# Patient Record
Sex: FEMALE | HISPANIC OR LATINO | Employment: FULL TIME | URBAN - METROPOLITAN AREA
[De-identification: names, ages, dates, MRNs, and addresses within clinical notes are randomized per-mention and may not be internally consistent; named-entity substitution may affect disease eponyms.]

---

## 2021-04-19 ENCOUNTER — NON-PROVIDER VISIT (OUTPATIENT)
Dept: URGENT CARE | Facility: PHYSICIAN GROUP | Age: 21
End: 2021-04-19

## 2021-04-19 DIAGNOSIS — Z11.1 PPD SCREENING TEST: ICD-10-CM

## 2021-04-19 PROCEDURE — 99999 PR NO CHARGE: CPT | Performed by: EMERGENCY MEDICINE

## 2021-04-19 PROCEDURE — 86580 TB INTRADERMAL TEST: CPT | Performed by: PHYSICIAN ASSISTANT

## 2021-04-20 NOTE — NON-PROVIDER
Amarilis PARKER is a 20 y.o. female here for a non-provider visit for PPD placement -- Step 1 of 2    Reason for PPD:  school requirement    1. TB evaluation questionnaire completed by patient? Yes      -  If any answers marked yes did you contact a provider prior to placing? Not Indicated  2.  Patient notified to return to clinic for reading on: 04/21/21 after 4:11 or 04/22/21 before 4:11  3.  PPD Placement documentation completed on TB evaluation questionnaire? Yes  4.  Location of TB evaluation questionnaire filed:  file

## 2021-04-20 NOTE — ADDENDUM NOTE
Addended by: HANK CORTES on: 4/19/2021 05:06 PM     Modules accepted: Level of Service, SmartSet

## 2021-04-22 ENCOUNTER — NON-PROVIDER VISIT (OUTPATIENT)
Dept: URGENT CARE | Facility: PHYSICIAN GROUP | Age: 21
End: 2021-04-22

## 2021-04-22 LAB — TB WHEAL 3D P 5 TU DIAM: NORMAL MM

## 2021-04-22 PROCEDURE — 99999 PR NO CHARGE: CPT | Performed by: NURSE PRACTITIONER

## 2021-04-22 NOTE — NON-PROVIDER
Amarilis PARKER is a 20 y.o. female here for a non-provider visit for PPD reading -- Step 1 of 2.      1.  Resulted in Epic under enter/edit results? Yes   2.  TB evaluation questionnaire scanned into chart and original given to patient?Yes      3. Was induration greater than 0 mm? No.    If Step 1 of 2, when is patient returning for second step (delete if N/A): Monday 26th or Tuesday the 27th at the earliest     Routed to PCP? No

## 2021-04-27 ENCOUNTER — NON-PROVIDER VISIT (OUTPATIENT)
Dept: URGENT CARE | Facility: PHYSICIAN GROUP | Age: 21
End: 2021-04-27

## 2021-04-27 DIAGNOSIS — Z11.1 PPD SCREENING TEST: ICD-10-CM

## 2021-04-27 PROCEDURE — 86580 TB INTRADERMAL TEST: CPT | Performed by: FAMILY MEDICINE

## 2021-04-27 PROCEDURE — 99999 PR NO CHARGE: CPT | Performed by: FAMILY MEDICINE

## 2021-04-28 NOTE — NON-PROVIDER
Amarilis PARKER is a 20 y.o. female here for a non-provider visit for PPD placement -- Step 2 of 2    Reason for PPD:  school requirement    1. TB evaluation questionnaire completed by patient? Yes      -  If any answers marked yes did you contact a provider prior to placing? Not Indicated  2.  Patient notified to return to clinic for reading on: 04/29/21 after 4:00 or 04/30/21 before 4:00  3.  PPD Placement documentation completed on TB evaluation questionnaire? Yes  4.  Location of TB evaluation questionnaire filed:  file

## 2021-04-30 ENCOUNTER — OFFICE VISIT (OUTPATIENT)
Dept: URGENT CARE | Facility: PHYSICIAN GROUP | Age: 21
End: 2021-04-30

## 2021-04-30 LAB — TB WHEAL 3D P 5 TU DIAM: 0 MM

## 2021-04-30 NOTE — NON-PROVIDER
Amarilis ANANDSimaDEJAN is a 20 y.o. female here for a non-provider visit for PPD reading -- Step 2 of 2.      1.  Resulted in Epic under enter/edit results? Yes   2.  TB evaluation questionnaire scanned into chart and original given to patient?Yes      3. Was induration greater than 0 mm? No.      Routed to PCP? No

## 2021-05-19 ENCOUNTER — OFFICE VISIT (OUTPATIENT)
Dept: URGENT CARE | Facility: PHYSICIAN GROUP | Age: 21
End: 2021-05-19
Payer: OTHER GOVERNMENT

## 2021-05-19 ENCOUNTER — HOSPITAL ENCOUNTER (OUTPATIENT)
Facility: MEDICAL CENTER | Age: 21
End: 2021-05-19
Attending: NURSE PRACTITIONER
Payer: OTHER GOVERNMENT

## 2021-05-19 VITALS
RESPIRATION RATE: 20 BRPM | WEIGHT: 180 LBS | HEART RATE: 83 BPM | OXYGEN SATURATION: 99 % | DIASTOLIC BLOOD PRESSURE: 58 MMHG | TEMPERATURE: 98.2 F | BODY MASS INDEX: 30.73 KG/M2 | HEIGHT: 64 IN | SYSTOLIC BLOOD PRESSURE: 104 MMHG

## 2021-05-19 DIAGNOSIS — R09.81 NASAL CONGESTION WITH RHINORRHEA: ICD-10-CM

## 2021-05-19 DIAGNOSIS — R05.9 COUGH: ICD-10-CM

## 2021-05-19 DIAGNOSIS — J02.9 SORE THROAT: ICD-10-CM

## 2021-05-19 DIAGNOSIS — J34.89 NASAL CONGESTION WITH RHINORRHEA: ICD-10-CM

## 2021-05-19 LAB
COVID ORDER STATUS COVID19: NORMAL
INT CON NEG: NEGATIVE
INT CON POS: POSITIVE
S PYO AG THROAT QL: NEGATIVE

## 2021-05-19 PROCEDURE — U0005 INFEC AGEN DETEC AMPLI PROBE: HCPCS

## 2021-05-19 PROCEDURE — U0003 INFECTIOUS AGENT DETECTION BY NUCLEIC ACID (DNA OR RNA); SEVERE ACUTE RESPIRATORY SYNDROME CORONAVIRUS 2 (SARS-COV-2) (CORONAVIRUS DISEASE [COVID-19]), AMPLIFIED PROBE TECHNIQUE, MAKING USE OF HIGH THROUGHPUT TECHNOLOGIES AS DESCRIBED BY CMS-2020-01-R: HCPCS

## 2021-05-19 PROCEDURE — 99203 OFFICE O/P NEW LOW 30 MIN: CPT | Performed by: NURSE PRACTITIONER

## 2021-05-19 PROCEDURE — 87880 STREP A ASSAY W/OPTIC: CPT | Performed by: NURSE PRACTITIONER

## 2021-05-19 ASSESSMENT — ENCOUNTER SYMPTOMS
EYE DISCHARGE: 0
CHILLS: 0
MYALGIAS: 0
NECK PAIN: 0
WEAKNESS: 0
CONSTIPATION: 0
VOMITING: 0
COUGH: 1
WHEEZING: 0
HEADACHES: 0
NAUSEA: 0
SINUS PAIN: 1
DIZZINESS: 0
SHORTNESS OF BREATH: 0
ABDOMINAL PAIN: 0
SORE THROAT: 1
EYE REDNESS: 0
DIARRHEA: 0
FEVER: 0

## 2021-05-19 NOTE — PROGRESS NOTES
Subjective:      Amarilis PARKER is a 20 y.o. female who presents with Sore Throat (4x days), Head Pain (2x days), and Sinus Problem (preasure, 4x day)            HPI  Experiencing nasal congestion with runny nose and post nasal drainage, dry scratchy throat, mild cough without shortness of breath , wheeze or history of asthma. 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 Unknown direct contact to others with illness or COVID. Facial pressure along maxillary region. Ear pressure, sniffling. No history of seasonal allergies. Taking Tylenol and Maye [place] cold/cough and Vicks nasal spray. No salt water gargle. Requesting work note.    PMH:  has no past medical history on file.  MEDS:   Current Outpatient Medications:   •  Dextromethorphan-Benzocaine (CEPACOL SORE THROAT & COUGH MT), Spray  in mouth/throat. (Patient not taking: Reported on 5/19/2021), Disp: , Rfl:   •  fluticasone (FLONASE) 50 MCG/ACT nasal spray, Spray 2 Sprays in nose every day. (Patient not taking: Reported on 5/19/2021), Disp: 1 Bottle, Rfl: 1  ALLERGIES: No Known Allergies  SURGHX: History reviewed. No pertinent surgical history.  SOCHX:  reports that she has never smoked. She has never used smokeless tobacco. She reports that she does not drink alcohol and does not use drugs.  FH: Family history was reviewed, no pertinent findings to report    Review of Systems   Constitutional: Positive for malaise/fatigue. Negative for chills and fever.   HENT: Positive for congestion, ear pain, sinus pain and sore throat.    Eyes: Negative for discharge and redness.   Respiratory: Positive for cough. Negative for shortness of breath and wheezing.    Gastrointestinal: Negative for abdominal pain, constipation, diarrhea, nausea and vomiting.   Musculoskeletal: Negative for myalgias and neck pain.   Skin: Negative for itching and rash.   Neurological: Negative for dizziness, weakness and headaches.   Endo/Heme/Allergies: Negative for environmental  "allergies.   All other systems reviewed and are negative.         Objective:     /58 (BP Location: Right arm, Patient Position: Sitting, BP Cuff Size: Adult)   Pulse 83   Temp 36.8 °C (98.2 °F) (Temporal)   Resp 20   Ht 1.626 m (5' 4\")   Wt 81.6 kg (180 lb)   SpO2 99%   BMI 30.90 kg/m²      Physical Exam  Vitals reviewed.   Constitutional:       General: She is awake. She is not in acute distress.     Appearance: Normal appearance. She is well-developed. She is not ill-appearing, toxic-appearing or diaphoretic.   HENT:      Head: Normocephalic.      Right Ear: Ear canal and external ear normal. A middle ear effusion is present.      Left Ear: Ear canal and external ear normal. A middle ear effusion is present.      Nose: Mucosal edema, congestion and rhinorrhea present. No nasal tenderness.      Mouth/Throat:      Lips: Pink.      Mouth: Mucous membranes are dry.      Pharynx: Uvula midline. Pharyngeal swelling and posterior oropharyngeal erythema present. No oropharyngeal exudate or uvula swelling.      Tonsils: No tonsillar exudate or tonsillar abscesses. 1+ on the right. 1+ on the left.   Eyes:      Conjunctiva/sclera: Conjunctivae normal.      Pupils: Pupils are equal, round, and reactive to light.   Cardiovascular:      Rate and Rhythm: Normal rate.   Pulmonary:      Effort: Pulmonary effort is normal. No tachypnea, accessory muscle usage or respiratory distress.      Breath sounds: Normal breath sounds and air entry. No stridor, decreased air movement or transmitted upper airway sounds. No decreased breath sounds, wheezing, rhonchi or rales.   Musculoskeletal:         General: Normal range of motion.      Cervical back: Normal range of motion and neck supple.   Skin:     General: Skin is warm and dry.   Neurological:      Mental Status: She is alert and oriented to person, place, and time.      GCS: GCS eye subscore is 4. GCS verbal subscore is 5. GCS motor subscore is 6.   Psychiatric:         " Attention and Perception: Attention and perception normal.         Mood and Affect: Mood and affect normal.         Speech: Speech normal.         Behavior: Behavior normal. Behavior is cooperative.                        Assessment/Plan:        1. Sore throat    - POCT Rapid Strep A: NEG  - SARS-CoV-2 PCR (24 hour In-House): Collect NP swab in VTM; Future    2. Nasal congestion with rhinorrhea    - SARS-CoV-2 PCR (24 hour In-House): Collect NP swab in VTM; Future    3. Cough    - SARS-CoV-2 PCR (24 hour In-House): Collect NP swab in VTM; Future    Discontinue Vicks nasal spray and Maye [place] cold/cough med at this time  Increase water intake  Get rest  May use over the counter  Ibuprofen/Tylenol as needed for any fever, body aches or throat pain  May take longer acting antihistamine for seasonal allergy symptoms as needed x 7 days,   May use saline nasal spray for nasal congestion daily 2-3x/day  May use Flonase or Nasocort for allergen nasal congestion daily x 7 days  May gargle with salt water as needed for throat discomfort  May drink smoothies for nutrition if too painful to swallow solid foods  Monitor for productive cough, sob and chest pain/tightness, thick nasal discharge - need re-evaluation      MyChart release of COVID test, may take up to 24 hrs for test result, patient notified, patient will call [employer] helpline to sign up with mktgt, may call office for test result as well

## 2021-05-19 NOTE — LETTER
May 19, 2021       Patient: Amarilis PARKER   YOB: 2000   Date of Visit: 5/19/2021         To Whom It May Concern:    In my medical opinion, I recommend that Amarilis PARKER be excused from work absence this week due to illness as she has been seen in Urgent Care and has been tested for COVID today. Please excuse absence until she receives her COVID test result that will be resulted in the next 24 hrs. She will have access to her mycirQlehart to retrieve her result.   If you have any questions or concerns, please don't hesitate to call [phone]          Sincerely,          ALTAGRACIA Thapa.  Electronically Signed

## 2021-05-20 ENCOUNTER — TELEPHONE (OUTPATIENT)
Dept: URGENT CARE | Facility: PHYSICIAN GROUP | Age: 21
End: 2021-05-20

## 2021-05-20 LAB
SARS-COV-2 RNA RESP QL NAA+PROBE: NOTDETECTED
SPECIMEN SOURCE: NORMAL

## 2021-05-20 NOTE — TELEPHONE ENCOUNTER
----- Message from TINO Thapa sent at 5/20/2021 12:26 PM PDT -----  Can you please call this patient regarding her negative COVID test, may leave message.    Thank you.     Carmelina

## 2021-05-20 NOTE — TELEPHONE ENCOUNTER
Phone Number Called: [phone] (home)       Call outcome: Left VM for patient regarding her Covid results

## 2021-06-01 ENCOUNTER — TELEPHONE (OUTPATIENT)
Dept: SCHEDULING | Facility: IMAGING CENTER | Age: 21
End: 2021-06-01

## 2021-06-08 ENCOUNTER — OFFICE VISIT (OUTPATIENT)
Dept: MEDICAL GROUP | Facility: PHYSICIAN GROUP | Age: 21
End: 2021-06-08
Payer: OTHER GOVERNMENT

## 2021-06-08 ENCOUNTER — TELEPHONE (OUTPATIENT)
Dept: MEDICAL GROUP | Facility: PHYSICIAN GROUP | Age: 21
End: 2021-06-08

## 2021-06-08 ENCOUNTER — HOSPITAL ENCOUNTER (OUTPATIENT)
Dept: LAB | Facility: MEDICAL CENTER | Age: 21
End: 2021-06-08
Attending: FAMILY MEDICINE
Payer: OTHER GOVERNMENT

## 2021-06-08 VITALS
TEMPERATURE: 97.2 F | OXYGEN SATURATION: 100 % | RESPIRATION RATE: 16 BRPM | DIASTOLIC BLOOD PRESSURE: 60 MMHG | HEIGHT: 65 IN | WEIGHT: 193.4 LBS | BODY MASS INDEX: 32.22 KG/M2 | SYSTOLIC BLOOD PRESSURE: 108 MMHG | HEART RATE: 64 BPM

## 2021-06-08 DIAGNOSIS — E66.9 OBESITY (BMI 30-39.9): ICD-10-CM

## 2021-06-08 DIAGNOSIS — F32.0 CURRENT MILD EPISODE OF MAJOR DEPRESSIVE DISORDER, UNSPECIFIED WHETHER RECURRENT (HCC): ICD-10-CM

## 2021-06-08 DIAGNOSIS — Z76.89 ENCOUNTER TO ESTABLISH CARE: ICD-10-CM

## 2021-06-08 PROBLEM — F32.9 MAJOR DEPRESSIVE DISORDER: Status: ACTIVE | Noted: 2021-06-08

## 2021-06-08 LAB — TSH SERPL DL<=0.005 MIU/L-ACNC: 1.02 UIU/ML (ref 0.38–5.33)

## 2021-06-08 PROCEDURE — 36415 COLL VENOUS BLD VENIPUNCTURE: CPT

## 2021-06-08 PROCEDURE — 84443 ASSAY THYROID STIM HORMONE: CPT

## 2021-06-08 PROCEDURE — 99214 OFFICE O/P EST MOD 30 MIN: CPT | Performed by: FAMILY MEDICINE

## 2021-06-08 RX ORDER — ESCITALOPRAM OXALATE 10 MG/1
10 TABLET ORAL DAILY
Qty: 30 TABLET | Refills: 5 | Status: SHIPPED | OUTPATIENT
Start: 2021-06-08 | End: 2021-06-16

## 2021-06-08 ASSESSMENT — PATIENT HEALTH QUESTIONNAIRE - PHQ9
5. POOR APPETITE OR OVEREATING: 2 - MORE THAN HALF THE DAYS
SUM OF ALL RESPONSES TO PHQ QUESTIONS 1-9: 20
CLINICAL INTERPRETATION OF PHQ2 SCORE: 4

## 2021-06-08 NOTE — ASSESSMENT & PLAN NOTE
This is a chronic problem.  Patient states she been having gradual weight gain for no known reason.

## 2021-06-08 NOTE — PROGRESS NOTES
Subjective:     CC: Here to establish care and has several medical issues to discuss.    HPI:   Amarilis presents today with the following medical concerns:    Obesity (BMI 30-39.9)  This is a chronic problem.  Patient states she been having gradual weight gain for no known reason.    Major depressive disorder  This is a new problem.  Patient states she has been having intermittent episodes of depression since about age 16.  Usually she can work herself out of it but this last time it lasted a month and is not getting better.  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  She has times to where she has difficulty sleeping because she cannot stop thinking about things.  She also is losing interest in things she usually likes to do.  She is also thought that she would be better off dead but she has not made any plans to actually commit self-harm.  She is not been on any medications in the past.  She is also feeling fatigued.    Encounter to establish care  Patient is here today to establish care.  She has not had a Pap smear as of yet.  She not currently sexually active.      History reviewed. No pertinent past medical history.    Social History     Tobacco Use   • Smoking status: Never Smoker   • Smokeless tobacco: Never Used   Vaping Use   • Vaping Use: Never used   Substance Use Topics   • Alcohol use: No   • Drug use: No       Current Outpatient Medications Ordered in Epic   Medication Sig Dispense Refill   • escitalopram (LEXAPRO) 10 MG Tab Take 1 tablet by mouth every day. 30 tablet 5     No current Epic-ordered facility-administered medications on file.       Allergies:  Patient has no known allergies.    Health Maintenance: Discussed coming back in for Pap smear    ROS:  Gen: no fevers/chills.  Has had increase in weight  Eyes: no changes in vision  ENT: no sore throat, no hearing loss, no bloody  "nose  Pulm: no sob, no cough  CV: no chest pain, no palpitations  GI: no nausea/vomiting, no diarrhea  : no dysuria  MSk: no myalgias  Skin: no rash  Neuro: no headaches, no numbness/tingling  Heme/Lymph: no easy bruising      Objective:       Exam:  /60 (BP Location: Right arm, Patient Position: Sitting, BP Cuff Size: Large adult)   Pulse 64   Temp 36.2 °C (97.2 °F) (Temporal)   Resp 16   Ht 1.651 m (5' 5\")   Wt 87.7 kg (193 lb 6.4 oz)   SpO2 100%   BMI 32.18 kg/m²  Body mass index is 32.18 kg/m².    Gen: Alert and oriented, No apparent distress.  Neck: Neck is supple without lymphadenopathy.  Thyroid exam is normal  Lungs: Normal effort, CTA bilaterally, no wheezes, rhonchi, or rales  CV: Regular rate and rhythm. No murmurs, rubs, or gallops.  Ext: No clubbing, cyanosis, edema.    Assessment & Plan:     20 y.o. female with the following -     1. Obesity (BMI 30-39.9)  This is a chronic problem.  Lab test ordered.  - TSH WITH REFLEX TO FT4; Future    2. Encounter to establish care  Patient establish care today.  She is encouraged to come back in for an annual exam and Pap smear.  She also states that her menses have been somewhat irregular but she is not pregnant as she is not sexually active.  This most likely is due to her stress but may also be due to thyroid disease.  Lab results pending    3. Current mild episode of major depressive disorder, unspecified whether recurrent (HCC)  This is a new problem.  I discussed treatment with the patient.  Will refer her to [facility] for counseling.  Also started today on Lexapro.  She is to call or return to clinic in 1 to 2 weeks for follow-up.  - TSH WITH REFLEX TO FT4; Future  - REFERRAL TO BEHAVIORAL HEALTH      Return if symptoms worsen or fail to improve.    Please note that this dictation was created using voice recognition software. I have made every reasonable attempt to correct obvious errors, but I expect that there are errors of grammar and " possibly content that I did not discover before finalizing the note.

## 2021-06-08 NOTE — ASSESSMENT & PLAN NOTE
Patient is here today to establish care.  She has not had a Pap smear as of yet.  She not currently sexually active.

## 2021-06-08 NOTE — ASSESSMENT & PLAN NOTE
This is a new problem.  Patient states she has been having intermittent episodes of depression since about age 16.  Usually she can work herself out of it but this last time it lasted a month and is not getting better.  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  She has times to where she has difficulty sleeping because she cannot stop thinking about things.  She also is losing interest in things she usually likes to do.  She is also thought that she would be better off dead but she has not made any plans to actually commit self-harm.  She is not been on any medications in the past.  She is also feeling fatigued.

## 2021-06-08 NOTE — TELEPHONE ENCOUNTER
Phone Number Called: [phone]  Call outcome: Spoke to patient regarding message below.   Advised pt to make sure contact our office for medication up date in 1 week and let us know how she feels with the medication she was put on.    Message: Received: Today  Zane Ray III, M.D.  Mia Weems, Med Ass't  Please let her know that her thyroid test was completely normal.    Dr. Degroot

## 2021-06-16 ENCOUNTER — TELEPHONE (OUTPATIENT)
Dept: MEDICAL GROUP | Facility: PHYSICIAN GROUP | Age: 21
End: 2021-06-16

## 2021-06-16 RX ORDER — ESCITALOPRAM OXALATE 20 MG/1
20 TABLET ORAL DAILY
Qty: 30 TABLET | Refills: 1 | Status: SHIPPED
Start: 2021-06-16 | End: 2021-10-26

## 2021-06-16 NOTE — TELEPHONE ENCOUNTER
Informed pt of Dr Zane Ray message below.    Zane Ray III, M.D.  Mia Weems, Med Ass't  Caller: Unspecified (Today, 11:18 AM)  Let her know that I want to go ahead and increase her dose to 20 mg a day and have her report back in about 2 weeks to see if it works better for her.    Dr. Zane Hung Messages

## 2021-06-16 NOTE — TELEPHONE ENCOUNTER
I spoke with the pt regarding an update of her new prescription.  Pt says that she has not noticing any side effects except for a little nauseated sometimes.  Med is helping her to sleep, and controlling her emotions. However; towards the end of the day about 5:00pm she noticing the medication is wearing out.   He mood is starting to change.  She usually takes the medication at 7:00am.

## 2021-06-16 NOTE — TELEPHONE ENCOUNTER
1st attempt:  LVM for pt to call clinic for medication update.  In reference to Dr Zane Ray message below.    Received: 1 week ago  Zane Ray III, M.D.  Mia Weems, Med Ass't  If she has not called in 1 week please call and get a report from her on how she is doing on her medication. Dr Degroot

## 2021-08-17 ENCOUNTER — EH NON-PROVIDER (OUTPATIENT)
Dept: OCCUPATIONAL MEDICINE | Facility: CLINIC | Age: 21
End: 2021-08-17
Payer: OTHER GOVERNMENT

## 2021-08-17 ENCOUNTER — EMPLOYEE HEALTH (OUTPATIENT)
Dept: OCCUPATIONAL MEDICINE | Facility: CLINIC | Age: 21
End: 2021-08-17
Payer: OTHER GOVERNMENT

## 2021-08-17 ENCOUNTER — HOSPITAL ENCOUNTER (OUTPATIENT)
Facility: MEDICAL CENTER | Age: 21
End: 2021-08-17
Attending: NURSE PRACTITIONER
Payer: COMMERCIAL

## 2021-08-17 DIAGNOSIS — Z02.1 PRE-EMPLOYMENT HEALTH SCREENING EXAMINATION: ICD-10-CM

## 2021-08-17 DIAGNOSIS — Z02.89 ENCOUNTER FOR OCCUPATIONAL HEALTH ASSESSMENT: ICD-10-CM

## 2021-08-17 DIAGNOSIS — Z02.1 PRE-EMPLOYMENT DRUG SCREENING: ICD-10-CM

## 2021-08-17 LAB
AMP AMPHETAMINE: NORMAL
BAR BARBITURATES: NORMAL
BZO BENZODIAZEPINES: NORMAL
COC COCAINE: NORMAL
INT CON NEG: NORMAL
INT CON POS: NORMAL
MDMA ECSTASY: NORMAL
MET METHAMPHETAMINES: NORMAL
MTD METHADONE: NORMAL
OPI OPIATES: NORMAL
OXY OXYCODONE: NORMAL
PCP PHENCYCLIDINE: NORMAL
POC URINE DRUG SCREEN OCDRS: NEGATIVE
THC: NORMAL

## 2021-08-17 PROCEDURE — 80305 DRUG TEST PRSMV DIR OPT OBS: CPT | Performed by: NURSE PRACTITIONER

## 2021-08-17 PROCEDURE — 8915 PR COMPREHENSIVE PHYSICAL: Performed by: NURSE PRACTITIONER

## 2021-08-17 PROCEDURE — 86762 RUBELLA ANTIBODY: CPT | Performed by: NURSE PRACTITIONER

## 2021-08-17 PROCEDURE — 86480 TB TEST CELL IMMUN MEASURE: CPT | Performed by: NURSE PRACTITIONER

## 2021-08-17 PROCEDURE — 94375 RESPIRATORY FLOW VOLUME LOOP: CPT | Performed by: NURSE PRACTITIONER

## 2021-08-17 PROCEDURE — 86765 RUBEOLA ANTIBODY: CPT | Performed by: NURSE PRACTITIONER

## 2021-08-17 PROCEDURE — 86735 MUMPS ANTIBODY: CPT | Performed by: NURSE PRACTITIONER

## 2021-08-17 PROCEDURE — 90716 VAR VACCINE LIVE SUBQ: CPT | Performed by: NURSE PRACTITIONER

## 2021-08-18 LAB
MEV IGG SER IA-ACNC: 0.64
MUV IGG SER IA-ACNC: 1.25
RUBV AB SER QL: 355 IU/ML

## 2021-08-19 LAB
GAMMA INTERFERON BACKGROUND BLD IA-ACNC: 0.03 IU/ML
M TB IFN-G BLD-IMP: NEGATIVE
M TB IFN-G CD4+ BCKGRND COR BLD-ACNC: 0 IU/ML
MITOGEN IGNF BCKGRD COR BLD-ACNC: >10 IU/ML
QFT TB2 - NIL TBQ2: 0 IU/ML

## 2021-08-30 ENCOUNTER — TELEPHONE (OUTPATIENT)
Dept: OCCUPATIONAL MEDICINE | Facility: CLINIC | Age: 21
End: 2021-08-30

## 2021-08-30 NOTE — TELEPHONE ENCOUNTER
Phone Number Called: [phone] (home)       Call outcome: Did not leave a detailed message. Requested patient to call back.    Message: LVM. PATIENT NEEDS TO MAKE AN APPT FOR MMR VACCINE. SHE CAME BACK LOW ON THE TITER.

## 2021-09-14 ENCOUNTER — EH NON-PROVIDER (OUTPATIENT)
Dept: OCCUPATIONAL MEDICINE | Facility: CLINIC | Age: 21
End: 2021-09-14
Payer: OTHER GOVERNMENT

## 2021-09-14 DIAGNOSIS — Z02.89 VISIT FOR OCCUPATIONAL HEALTH EXAMINATION: Primary | ICD-10-CM

## 2021-09-14 PROCEDURE — 90707 MMR VACCINE SC: CPT | Performed by: NURSE PRACTITIONER

## 2021-10-01 ENCOUNTER — EH NON-PROVIDER (OUTPATIENT)
Dept: OCCUPATIONAL MEDICINE | Facility: CLINIC | Age: 21
End: 2021-10-01
Payer: COMMERCIAL

## 2021-10-01 DIAGNOSIS — Z71.85 IMMUNIZATION COUNSELING: ICD-10-CM

## 2021-10-15 ENCOUNTER — EH NON-PROVIDER (OUTPATIENT)
Dept: OCCUPATIONAL MEDICINE | Facility: CLINIC | Age: 21
End: 2021-10-15
Payer: COMMERCIAL

## 2021-10-15 DIAGNOSIS — Z23 NEED FOR VACCINATION: Primary | ICD-10-CM

## 2021-10-15 PROCEDURE — 90686 IIV4 VACC NO PRSV 0.5 ML IM: CPT | Performed by: NURSE PRACTITIONER

## 2021-10-15 PROCEDURE — 90707 MMR VACCINE SC: CPT | Performed by: NURSE PRACTITIONER

## 2021-10-15 NOTE — NON-PROVIDER
Unable to print MMR consent due to printer not working. Tried printing from another location and it was still not printing

## 2021-10-26 ENCOUNTER — OFFICE VISIT (OUTPATIENT)
Dept: MEDICAL GROUP | Facility: MEDICAL CENTER | Age: 21
End: 2021-10-26
Payer: COMMERCIAL

## 2021-10-26 ENCOUNTER — PHARMACY VISIT (OUTPATIENT)
Dept: PHARMACY | Facility: MEDICAL CENTER | Age: 21
End: 2021-10-26
Payer: COMMERCIAL

## 2021-10-26 VITALS
TEMPERATURE: 97.4 F | HEART RATE: 71 BPM | SYSTOLIC BLOOD PRESSURE: 116 MMHG | DIASTOLIC BLOOD PRESSURE: 72 MMHG | OXYGEN SATURATION: 99 % | WEIGHT: 188 LBS | HEIGHT: 65 IN | BODY MASS INDEX: 31.32 KG/M2 | RESPIRATION RATE: 16 BRPM

## 2021-10-26 DIAGNOSIS — L30.9 DERMATITIS: ICD-10-CM

## 2021-10-26 PROCEDURE — RXMED WILLOW AMBULATORY MEDICATION CHARGE: Performed by: STUDENT IN AN ORGANIZED HEALTH CARE EDUCATION/TRAINING PROGRAM

## 2021-10-26 PROCEDURE — 99212 OFFICE O/P EST SF 10 MIN: CPT | Performed by: STUDENT IN AN ORGANIZED HEALTH CARE EDUCATION/TRAINING PROGRAM

## 2021-10-26 RX ORDER — CLOBETASOL PROPIONATE 0.5 MG/G
1 CREAM TOPICAL 2 TIMES DAILY
Qty: 30 G | Refills: 0 | Status: SHIPPED | OUTPATIENT
Start: 2021-10-26 | End: 2021-10-26 | Stop reason: SDUPTHER

## 2021-10-26 RX ORDER — CLOBETASOL PROPIONATE 0.5 MG/G
1 CREAM TOPICAL 2 TIMES DAILY
Qty: 30 G | Refills: 0 | Status: SHIPPED | OUTPATIENT
Start: 2021-10-26 | End: 2021-10-28

## 2021-10-26 NOTE — PROGRESS NOTES
"Subjective:     CC: Rash    HPI:   Amarilis presents today with  Bilateral hand rash  Patient presents today for rash on bilateral hands.  Patient states that she first noticed it 2 days ago on Sunday morning.  Patient states that it was red, irritated and small bumps.  Patient states that it was incredibly itchy and she is having difficulty not scratching.  Patient has tried ice, Claritin, hydrocortisone cream.  Patient states that the hydrocortisone cream was helpful on Sunday but made the rash burn and feel worse on Monday.  Patient denies rash anywhere else on her body.  Patient denies any contact with new irritants.  Patient did use acetone over the weekend to take off nail polish and works in healthcare with frequent hand , gloves, hand washing.      ROS:  Gen: no fevers/chills  Pulm: no sob, no cough  CV: no chest pain, no palpitations  GI: no nausea/vomiting, no diarrhea  Neuro: no headaches, no numbness/tingling  Heme/Lymph: no easy bruising      Objective:     Exam:  /72 (BP Location: Right arm, Patient Position: Sitting, BP Cuff Size: Adult)   Pulse 71   Temp 36.3 °C (97.4 °F) (Temporal)   Resp 16   Ht 1.651 m (5' 5\")   Wt 85.3 kg (188 lb)   SpO2 99%   BMI 31.28 kg/m²  Body mass index is 31.28 kg/m².    Gen: Alert and oriented, No apparent distress.  Neck: Neck is supple without lymphadenopathy.  Lungs: Normal effort, CTA bilaterally, no wheezes, rhonchi, or rales  CV: Regular rate and rhythm. No murmurs, rubs, or gallops.  Skin: Erythematous, raised pruritic rash on bilateral hands worse on left dorsal thumb, & right ventral thumb    Assessment & Plan:     21 y.o. female with the following -     1. Dermatitis  Acute, stable.  Patient presents today for rash on bilateral hands x2 days.  Rash is not anywhere else on her body.  Patient denies any recent irritants.  Will trial clobetasol cream and patient encouraged to keep her hands moisturized with Aquaphor, Vaseline, Cetaphil, or cerva " moisturizers to avoid further irritation.  Patient encouraged to follow-up if no improvement.  - clobetasol (TEMOVATE) 0.05 % Cream; Apply 1 Application topically 2 times a day.  Dispense: 30 g; Refill: 0      No follow-ups on file.    Please note that this dictation was created using voice recognition software. I have made every reasonable attempt to correct obvious errors, but I expect that there are errors of grammar and possibly content that I did not discover before finalizing the note.

## 2021-10-28 ENCOUNTER — OFFICE VISIT (OUTPATIENT)
Dept: URGENT CARE | Facility: PHYSICIAN GROUP | Age: 21
End: 2021-10-28
Payer: COMMERCIAL

## 2021-10-28 VITALS
HEIGHT: 65 IN | TEMPERATURE: 99 F | HEART RATE: 111 BPM | OXYGEN SATURATION: 98 % | DIASTOLIC BLOOD PRESSURE: 62 MMHG | RESPIRATION RATE: 16 BRPM | BODY MASS INDEX: 31.32 KG/M2 | WEIGHT: 188 LBS | SYSTOLIC BLOOD PRESSURE: 122 MMHG

## 2021-10-28 DIAGNOSIS — L30.1 DYSHIDROSIS: ICD-10-CM

## 2021-10-28 PROCEDURE — 99213 OFFICE O/P EST LOW 20 MIN: CPT | Performed by: FAMILY MEDICINE

## 2021-10-28 RX ORDER — PREDNISONE 20 MG/1
40 TABLET ORAL DAILY
Qty: 10 TABLET | Refills: 0 | Status: SHIPPED | OUTPATIENT
Start: 2021-10-28 | End: 2021-11-02

## 2021-10-28 RX ORDER — TRIAMCINOLONE ACETONIDE 1 MG/G
OINTMENT TOPICAL
Qty: 30 G | Refills: 1 | Status: SHIPPED
Start: 2021-10-28 | End: 2022-02-16

## 2021-10-28 ASSESSMENT — ENCOUNTER SYMPTOMS
SENSORY CHANGE: 0
FOCAL WEAKNESS: 0
MYALGIAS: 0
FEVER: 0

## 2021-10-29 NOTE — PROGRESS NOTES
"Subjective     Amarilis Gillespie is a 21 y.o. female who presents with Rash (left hand and pt states that is spreading into her left hand; 4x days)            4 days itching rash bilateral hands. No clear trigger but she has used acetone polish remover recently. She diseases in the past without problems. Heat in the shower seem to make symptoms worse. She is also an MA and uses gloves frequently. She was recently prescribed clobetasol and notes that her hands feel worse with this/leathery. No oral swelling. No scalp involvement. No past medical history of the same. No other aggravating or alleviating factors.      Review of Systems   Constitutional: Negative for fever.   Musculoskeletal: Negative for joint pain and myalgias.   Skin: Positive for itching.   Neurological: Negative for sensory change and focal weakness.              Objective     /62 (BP Location: Right arm, Patient Position: Sitting, BP Cuff Size: Adult)   Pulse (!) 111   Temp 37.2 °C (99 °F) (Temporal)   Resp 16   Ht 1.651 m (5' 5\")   Wt 85.3 kg (188 lb)   SpO2 98%   BMI 31.28 kg/m²      Physical Exam  Constitutional:       Appearance: Normal appearance.   Skin:     General: Skin is warm and dry.      Comments: Bilateral hands pruritic plaques and papules. Few tiny vesicles to fingers. No significant scaling. No warmth. No drainage. No lymphangitis.   Neurological:      Mental Status: She is alert.                             Assessment & Plan        1. Dyshidrosis    - triamcinolone acetonide (KENALOG) 0.1 % Ointment; Apply thin layer to affected area twice daily as needed  Dispense: 30 g; Refill: 1  - Referral to Dermatology  - predniSONE (DELTASONE) 20 MG Tab; Take 2 Tablets by mouth every day for 5 days.  Dispense: 10 Tablet; Refill: 0     Differential diagnosis, natural history, supportive care, and indications for immediate follow-up discussed at length.     Discussed that treatment is primarily avoiding triggers and using topical " corticosteroids. I would like her to try an ointment based corticosteroid before using oral prednisone. Follow-up primary care and dermatology.

## 2021-11-20 PROCEDURE — RXMED WILLOW AMBULATORY MEDICATION CHARGE: Performed by: INTERNAL MEDICINE

## 2021-11-21 ENCOUNTER — PHARMACY VISIT (OUTPATIENT)
Dept: PHARMACY | Facility: MEDICAL CENTER | Age: 21
End: 2021-11-21
Payer: COMMERCIAL

## 2021-11-30 ENCOUNTER — OFFICE VISIT (OUTPATIENT)
Dept: MEDICAL GROUP | Facility: MEDICAL CENTER | Age: 21
End: 2021-11-30
Payer: COMMERCIAL

## 2021-11-30 VITALS
BODY MASS INDEX: 30.72 KG/M2 | OXYGEN SATURATION: 99 % | HEART RATE: 86 BPM | TEMPERATURE: 97.2 F | WEIGHT: 184.4 LBS | HEIGHT: 65 IN | DIASTOLIC BLOOD PRESSURE: 82 MMHG | SYSTOLIC BLOOD PRESSURE: 116 MMHG

## 2021-11-30 DIAGNOSIS — U07.1 COVID-19: ICD-10-CM

## 2021-11-30 DIAGNOSIS — R05.9 COUGH: ICD-10-CM

## 2021-11-30 LAB
EXTERNAL QUALITY CONTROL: NORMAL
SARS-COV+SARS-COV-2 AG RESP QL IA.RAPID: POSITIVE

## 2021-11-30 PROCEDURE — 99213 OFFICE O/P EST LOW 20 MIN: CPT | Mod: CS | Performed by: FAMILY MEDICINE

## 2021-11-30 PROCEDURE — 87426 SARSCOV CORONAVIRUS AG IA: CPT | Mod: QW | Performed by: FAMILY MEDICINE

## 2021-11-30 ASSESSMENT — ENCOUNTER SYMPTOMS
COUGH: 1
SORE THROAT: 1
FEVER: 1
SHORTNESS OF BREATH: 0

## 2021-11-30 NOTE — PATIENT INSTRUCTIONS
COVID-19  COVID-19 is a respiratory infection that is caused by a virus called severe acute respiratory syndrome coronavirus 2 (SARS-CoV-2). The disease is also known as coronavirus disease or novel coronavirus. In some people, the virus may not cause any symptoms. In others, it may cause a serious infection. The infection can get worse quickly and can lead to complications, such as:  · Pneumonia, or infection of the lungs.  · Acute respiratory distress syndrome or ARDS. This is fluid build-up in the lungs.  · Acute respiratory failure. This is a condition in which there is not enough oxygen passing from the lungs to the body.  · Sepsis or septic shock. This is a serious bodily reaction to an infection.  · Blood clotting problems.  · Secondary infections due to bacteria or fungus.  The virus that causes COVID-19 is contagious. This means that it can spread from person to person through droplets from coughs and sneezes (respiratory secretions).  What are the causes?  This illness is caused by a virus. You may catch the virus by:  · Breathing in droplets from an infected person's cough or sneeze.  · Touching something, like a table or a doorknob, that was exposed to the virus (contaminated) and then touching your mouth, nose, or eyes.  What increases the risk?  Risk for infection  You are more likely to be infected with this virus if you:  · Live in or travel to an area with a COVID-19 outbreak.  · Come in contact with a sick person who recently traveled to an area with a COVID-19 outbreak.  · Provide care for or live with a person who is infected with COVID-19.  Risk for serious illness  You are more likely to become seriously ill from the virus if you:  · Are 65 years of age or older.  · Have a long-term disease that lowers your body's ability to fight infection (immunocompromised).  · Live in a nursing home or long-term care facility.  · Have a long-term (chronic) disease such as:  ? Chronic lung disease, including  chronic obstructive pulmonary disease or asthma  ? Heart disease.  ? Diabetes.  ? Chronic kidney disease.  ? Liver disease.  · Are obese.  What are the signs or symptoms?  Symptoms of this condition can range from mild to severe. Symptoms may appear any time from 2 to 14 days after being exposed to the virus. They include:  · A fever.  · A cough.  · Difficulty breathing.  · Chills.  · Muscle pains.  · A sore throat.  · Loss of taste or smell.  Some people may also have stomach problems, such as nausea, vomiting, or diarrhea.  Other people may not have any symptoms of COVID-19.  How is this diagnosed?  This condition may be diagnosed based on:  · Your signs and symptoms, especially if:  ? You live in an area with a COVID-19 outbreak.  ? You recently traveled to or from an area where the virus is common.  ? You provide care for or live with a person who was diagnosed with COVID-19.  · A physical exam.  · Lab tests, which may include:  ? A nasal swab to take a sample of fluid from your nose.  ? A throat swab to take a sample of fluid from your throat.  ? A sample of mucus from your lungs (sputum).  ? Blood tests.  · Imaging tests, which may include, X-rays, CT scan, or ultrasound.  How is this treated?  At present, there is no medicine to treat COVID-19. Medicines that treat other diseases are being used on a trial basis to see if they are effective against COVID-19.  Your health care provider will talk with you about ways to treat your symptoms. For most people, the infection is mild and can be managed at home with rest, fluids, and over-the-counter medicines.  Treatment for a serious infection usually takes places in a hospital intensive care unit (ICU). It may include one or more of the following treatments. These treatments are given until your symptoms improve.  · Receiving fluids and medicines through an IV.  · Supplemental oxygen. Extra oxygen is given through a tube in the nose, a face mask, or a  wheeler.  · Positioning you to lie on your stomach (prone position). This makes it easier for oxygen to get into the lungs.  · Continuous positive airway pressure (CPAP) or bi-level positive airway pressure (BPAP) machine. This treatment uses mild air pressure to keep the airways open. A tube that is connected to a motor delivers oxygen to the body.  · Ventilator. This treatment moves air into and out of the lungs by using a tube that is placed in your windpipe.  · Tracheostomy. This is a procedure to create a hole in the neck so that a breathing tube can be inserted.  · Extracorporeal membrane oxygenation (ECMO). This procedure gives the lungs a chance to recover by taking over the functions of the heart and lungs. It supplies oxygen to the body and removes carbon dioxide.  Follow these instructions at home:  Lifestyle  · If you are sick, stay home except to get medical care. Your health care provider will tell you how long to stay home. Call your health care provider before you go for medical care.  · Rest at home as told by your health care provider.  · Do not use any products that contain nicotine or tobacco, such as cigarettes, e-cigarettes, and chewing tobacco. If you need help quitting, ask your health care provider.  · Return to your normal activities as told by your health care provider. Ask your health care provider what activities are safe for you.  General instructions  · Take over-the-counter and prescription medicines only as told by your health care provider.  · Drink enough fluid to keep your urine pale yellow.  · Keep all follow-up visits as told by your health care provider. This is important.  How is this prevented?    There is no vaccine to help prevent COVID-19 infection. However, there are steps you can take to protect yourself and others from this virus.  To protect yourself:   · Do not travel to areas where COVID-19 is a risk. The areas where COVID-19 is reported change often. To identify  high-risk areas and travel restrictions, check the [facility] travel website: wwwnc.cdc.gov/travel/notices  · If you live in, or must travel to, an area where COVID-19 is a risk, take precautions to avoid infection.  ? Stay away from people who are sick.  ? Wash your hands often with soap and water for 20 seconds. If soap and water are not available, use an alcohol-based hand .  ? Avoid touching your mouth, face, eyes, or nose.  ? Avoid going out in public, follow guidance from your state and local health authorities.  ? If you must go out in public, wear a cloth face covering or face mask.  ? Disinfect objects and surfaces that are frequently touched every day. This may include:  § Counters and tables.  § Doorknobs and light switches.  § Sinks and faucets.  § Electronics, such as phones, remote controls, keyboards, computers, and tablets.  To protect others:  If you have symptoms of COVID-19, take steps to prevent the virus from spreading to others.  · If you think you have a COVID-19 infection, contact your health care provider right away. Tell your health care team that you think you may have a COVID-19 infection.  · Stay home. Leave your house only to seek medical care. Do not use public transport.  · Do not travel while you are sick.  · Wash your hands often with soap and water for 20 seconds. If soap and water are not available, use alcohol-based hand .  · Stay away from other members of your household. Let healthy household members care for children and pets, if possible. If you have to care for children or pets, wash your hands often and wear a mask. If possible, stay in your own room, separate from others. Use a different bathroom.  · Make sure that all people in your household wash their hands well and often.  · Cough or sneeze into a tissue or your sleeve or elbow. Do not cough or sneeze into your hand or into the air.  · Wear a cloth face covering or face mask.  Where to find more  information  · Centers for Disease Control and Prevention: www.cdc.gov/coronavirus/2019-ncov/index.html  · World Health Organization: www.who.int/health-topics/coronavirus  Contact a health care provider if:  · You live in or have traveled to an area where COVID-19 is a risk and you have symptoms of the infection.  · You have had contact with someone who has COVID-19 and you have symptoms of the infection.  Get help right away if:  · You have trouble breathing.  · You have pain or pressure in your chest.  · You have confusion.  · You have bluish lips and fingernails.  · You have difficulty waking from sleep.  · You have symptoms that get worse.  These symptoms may represent a serious problem that is an emergency. Do not wait to see if the symptoms will go away. Get medical help right away. Call your local emergency services (911 in the [place] Do not drive yourself to the hospital. Let the emergency medical personnel know if you think you have COVID-19.  Summary  · COVID-19 is a respiratory infection that is caused by a virus. It is also known as coronavirus disease or novel coronavirus. It can cause serious infections, such as pneumonia, acute respiratory distress syndrome, acute respiratory failure, or sepsis.  · The virus that causes COVID-19 is contagious. This means that it can spread from person to person through droplets from coughs and sneezes.  · You are more likely to develop a serious illness if you are 65 years of age or older, have a weak immunity, live in a nursing home, or have chronic disease.  · There is no medicine to treat COVID-19. Your health care provider will talk with you about ways to treat your symptoms.  · Take steps to protect yourself and others from infection. Wash your hands often and disinfect objects and surfaces that are frequently touched every day. Stay away from people who are sick and wear a mask if you are sick.  This information is not intended to replace advice given to you by  your health care provider. Make sure you discuss any questions you have with your health care provider.  Document Released: 01/23/2020 Document Revised: 05/14/2020 Document Reviewed: 01/23/2020  Elsevier Patient Education © 2020 [employer]

## 2021-11-30 NOTE — PROGRESS NOTES
"Subjective:     CC: \"Cough and fever\"    HPI:   Amarilis presents today with valuation and management of:    Problem   Covid-19    This 21-year-old female who has had her first covered vaccine a week and a half ago presents to clinic with 6 days of cough and a fever of 101 last night.  She also endorses some congestion postnasal drainage with sore throat.  She also feels like she cannot smell very well but does not have loss of taste.  Patient in general this is feeling kind of rundown.  She first started feeling symptoms last Wednesday.  When she spiked a fever last night she took ibuprofen and that has mitigated the fever, she did take ibuprofen again this morning.         Current Outpatient Medications Ordered in Epic   Medication Sig Dispense Refill   • COVID-19 mRNA Vaccine, Pfizer, (PFIZER-BIONTECH COVID-19 VACC) 30 MCG/0.3ML Suspension injection Inject  into the shoulder, thigh, or buttocks. (Patient not taking: Reported on 11/30/2021) 0.3 mL 0   • triamcinolone acetonide (KENALOG) 0.1 % Ointment Apply thin layer to affected area twice daily as needed (Patient not taking: Reported on 11/30/2021) 30 g 1     No current [facility]-ordered facility-administered medications on file.       Health Maintenance: Not discussed    ROS:  Review of Systems   Constitutional: Positive for fever.   HENT: Positive for congestion and sore throat.    Respiratory: Positive for cough. Negative for shortness of breath.        Objective:     Exam:  /82   Pulse 86   Temp 36.2 °C (97.2 °F) (Temporal)   Ht 1.651 m (5' 5\")   Wt 83.6 kg (184 lb 6.4 oz)   SpO2 99%   BMI 30.69 kg/m²  Body mass index is 30.69 kg/m².    Physical Exam  Vitals reviewed.   Constitutional:       General: She is not in acute distress.     Appearance: She is ill-appearing. She is not toxic-appearing.   HENT:      Head: Normocephalic and atraumatic.      Mouth/Throat:      Mouth: Mucous membranes are moist.      Pharynx: Posterior oropharyngeal erythema " present. No oropharyngeal exudate.   Eyes:      Extraocular Movements: Extraocular movements intact.   Cardiovascular:      Rate and Rhythm: Normal rate and regular rhythm.      Heart sounds: Normal heart sounds.   Pulmonary:      Effort: Pulmonary effort is normal.      Breath sounds: Normal breath sounds.   Neurological:      Mental Status: She is alert.           Labs: POCT Covid +    Assessment & Plan:     21 y.o. female with the following -     Problem List Items Addressed This Visit     COVID-19     Acute problem  Rapid Covid test positive  Patient does not fit definition of high risk patient, does not qualify for antibiotic treatment at this time.  Work note provided patient want to stay home.  Symptomatic treatment, patient to contact office if worsening.                 Return if symptoms worsen or fail to improve.    Please note that this dictation was created using voice recognition software. I have made every reasonable attempt to correct obvious errors, but I expect that there are errors of grammar and possibly content that I did not discover before finalizing the note.

## 2021-11-30 NOTE — ASSESSMENT & PLAN NOTE
Acute problem  Rapid Covid test positive  Patient does not fit definition of high risk patient, does not qualify for antibiotic treatment at this time.  Work note provided patient want to stay home.  Symptomatic treatment, patient to contact office if worsening.

## 2021-11-30 NOTE — LETTER
[place] [facility] [address] [place] [address]     November 30, 2021    Patient: Amarilis Gillespie   YOB: 2000   Date of Visit: 11/30/2021       To Whom It May Concern:    Amarilis Gillespie was seen and treated in our department on 11/30/2021. She unfortunately tested positive for Covid-19 and will need to stay home from work.    Sincerely,           Dakota Romo D.O.

## 2021-12-03 ENCOUNTER — TELEPHONE (OUTPATIENT)
Dept: OCCUPATIONAL MEDICINE | Facility: CLINIC | Age: 21
End: 2021-12-03

## 2021-12-03 NOTE — TELEPHONE ENCOUNTER
The Patient/employee has been monitored. Sent a clearance e-mail to person -by the employee COVID screening line for a positive COVID test. They have been instructed to remain home from work for the 10 days since the onset of symptoms. If symptoms persist past the 10 days, they are instructed to contact their supervisor.      No pt identifier on VM. Return # [phone]. Sent a clearance e-mail to her personal address.     They are cleared to return to work on 12/5/21 or their next scheduled shift.     A clearance to return to work e-mail has been sent to their leader.

## 2021-12-13 ENCOUNTER — PHARMACY VISIT (OUTPATIENT)
Dept: PHARMACY | Facility: MEDICAL CENTER | Age: 21
End: 2021-12-13
Payer: COMMERCIAL

## 2021-12-13 PROCEDURE — RXMED WILLOW AMBULATORY MEDICATION CHARGE: Performed by: INTERNAL MEDICINE

## 2021-12-13 RX ORDER — RNA INGREDIENT BNT-162B2 0.23 G/1.8ML
INJECTION, SUSPENSION INTRAMUSCULAR
Qty: 0.3 ML | Refills: 0 | Status: SHIPPED | OUTPATIENT
Start: 2021-12-13 | End: 2023-01-03

## 2022-02-16 ENCOUNTER — OFFICE VISIT (OUTPATIENT)
Dept: URGENT CARE | Facility: PHYSICIAN GROUP | Age: 22
End: 2022-02-16
Payer: OTHER GOVERNMENT

## 2022-02-16 VITALS
DIASTOLIC BLOOD PRESSURE: 74 MMHG | HEART RATE: 72 BPM | SYSTOLIC BLOOD PRESSURE: 122 MMHG | TEMPERATURE: 98.1 F | HEIGHT: 65 IN | WEIGHT: 170 LBS | BODY MASS INDEX: 28.32 KG/M2 | RESPIRATION RATE: 16 BRPM | OXYGEN SATURATION: 100 %

## 2022-02-16 DIAGNOSIS — T78.40XA ALLERGIC REACTION, INITIAL ENCOUNTER: ICD-10-CM

## 2022-02-16 PROCEDURE — 99213 OFFICE O/P EST LOW 20 MIN: CPT | Performed by: FAMILY MEDICINE

## 2022-02-16 RX ORDER — DIPHENHYDRAMINE HCL 25 MG
25 TABLET ORAL EVERY 6 HOURS PRN
COMMUNITY
End: 2022-08-08

## 2022-02-16 RX ORDER — DEXAMETHASONE SODIUM PHOSPHATE 4 MG/ML
8 INJECTION, SOLUTION INTRA-ARTICULAR; INTRALESIONAL; INTRAMUSCULAR; INTRAVENOUS; SOFT TISSUE ONCE
Status: COMPLETED | OUTPATIENT
Start: 2022-02-16 | End: 2022-02-16

## 2022-02-16 RX ADMIN — DEXAMETHASONE SODIUM PHOSPHATE 8 MG: 4 INJECTION, SOLUTION INTRA-ARTICULAR; INTRALESIONAL; INTRAMUSCULAR; INTRAVENOUS; SOFT TISSUE at 13:17

## 2022-02-16 NOTE — PROGRESS NOTES
"Subjective     Amarilis Andrew is a 21 y.o. female who presents with Chemical Exposure (X 2 days with a rash and needs a doctors note)      - This is a pleasant and nontoxic appearing 21 y.o. female who has come to the walk-in clinic today for:    #1) itchy rash x2 days face. Started after using chemical peel at work. Using benadryl and improving . Needs note for work       ALLERGIES:  Patient has no known allergies.     PMH:  History reviewed. No pertinent past medical history.     PSH:  Past Surgical History:   Procedure Laterality Date   • APPENDECTOMY  2007       MEDS:    Current Outpatient Medications:   •  diphenhydrAMINE (BENADRYL) 25 MG Tab, Take 25 mg by mouth every 6 hours as needed for Sleep., Disp: , Rfl:   •  COVID-19 mRNA Vaccine, Pfizer, (PFIZER-BIONTECH COVID-19 VACC) 30 MCG/0.3ML Suspension injection, Inject  into the shoulder, thigh, or buttocks., Disp: 0.3 mL, Rfl: 0    Current Facility-Administered Medications:   •  dexamethasone (DECADRON) injection 8 mg, 8 mg, Oral, Once, Rigo Lewis M.D.    ** I have documented what I find to be significant in regards to past medical, social, family and surgical history  in my HPI or under PMH/PSH/FH review section, otherwise it is noncontributory **           HPI    Review of Systems   Skin: Positive for rash.   All other systems reviewed and are negative.             Objective     /74 (BP Location: Right arm, Patient Position: Sitting, BP Cuff Size: Large adult)   Pulse 72   Temp 36.7 °C (98.1 °F) (Temporal)   Resp 16   Ht 1.651 m (5' 5\")   Wt 77.1 kg (170 lb)   SpO2 100%   BMI 28.29 kg/m²      Physical Exam  Vitals and nursing note reviewed.   Constitutional:       General: She is not in acute distress.     Appearance: Normal appearance. She is well-developed.   HENT:      Head: Normocephalic and atraumatic.      Mouth/Throat:      Mouth: Mucous membranes are moist.      Pharynx: Oropharynx is clear. No oropharyngeal exudate or " posterior oropharyngeal erythema.   Eyes:      General: No scleral icterus.  Cardiovascular:      Heart sounds: Normal heart sounds. No murmur heard.  Pulmonary:      Effort: Pulmonary effort is normal. No respiratory distress.   Skin:     Coloration: Skin is not jaundiced or pale.      Findings: Rash ( faint erythema face ) present.   Neurological:      Mental Status: She is alert.      Motor: No abnormal muscle tone.   Psychiatric:         Mood and Affect: Mood normal.         Behavior: Behavior normal.         Assessment & Plan         1. Allergic reaction, initial encounter  dexamethasone (DECADRON) injection 8 mg       - Dx, plan & d/c instructions discussed   - Rest, stay hydrated  - E.R. precautions discussed     Asked to kindly follow up with their PCP's office in 2-3 days for a recheck, ER if not improving or feeling/getting worse. If you do not have a primary care doctor and need to schedule one you may call [facility] at [phone] to do this.    Any realistic side effects of medications that may have been given today reviewed.     Patient left in stable condition

## 2022-02-16 NOTE — LETTER
February 16, 2022         Patient: Amarilis Andrew   YOB: 2000   Date of Visit: 2/16/2022           To Whom it May Concern:    Amarilis Andrew was seen in my clinic on 2/16/2022. She may return to work in 2-3 days.    If you have any questions or concerns, please don't hesitate to call.        Sincerely,           Rigo Lewis M.D.  Electronically Signed

## 2022-02-16 NOTE — PATIENT INSTRUCTIONS
Discharge Instructions:  Certainly if not improving in the next 48-72 hours or developing new/concerning symptoms or getting/feeling worse (such as nausea, vomiting, fever/chills, chest pain, shortness of breath) return to walk-in clinic or ER immediately for in-person evaluation.    Please see your primary care doctor regularly for routine check-ups and call/visit with your primary care doctor to follow up on this visit to make sure you are improving and no additional treatment or on-going work up is needed. If you do not have a primary care doctor and need to schedule one you may call [facility] at [phone] to do this.

## 2022-02-16 NOTE — LETTER
February 16, 2022         Patient: Amarilis Andrew   YOB: 2000   Date of Visit: 2/16/2022           To Whom it May Concern:    Amarilis Andrew was seen in my clinic on 2/16/2022. She may return to work/school tomorrow.    If you have any questions or concerns, please don't hesitate to call.        Sincerely,           Rigo Lewis M.D.  Electronically Signed

## 2022-02-25 ENCOUNTER — OFFICE VISIT (OUTPATIENT)
Dept: URGENT CARE | Facility: PHYSICIAN GROUP | Age: 22
End: 2022-02-25
Payer: OTHER GOVERNMENT

## 2022-02-25 VITALS
RESPIRATION RATE: 12 BRPM | TEMPERATURE: 98.5 F | BODY MASS INDEX: 29.24 KG/M2 | SYSTOLIC BLOOD PRESSURE: 92 MMHG | HEART RATE: 67 BPM | WEIGHT: 171.3 LBS | OXYGEN SATURATION: 100 % | HEIGHT: 64 IN | DIASTOLIC BLOOD PRESSURE: 60 MMHG

## 2022-02-25 DIAGNOSIS — L30.9 DERMATITIS: ICD-10-CM

## 2022-02-25 PROCEDURE — 99213 OFFICE O/P EST LOW 20 MIN: CPT | Performed by: PHYSICIAN ASSISTANT

## 2022-02-25 RX ORDER — CETIRIZINE HYDROCHLORIDE 10 MG/1
CAPSULE, LIQUID FILLED ORAL
COMMUNITY
End: 2022-08-08

## 2022-02-25 ASSESSMENT — ENCOUNTER SYMPTOMS
HEADACHES: 0
SHORTNESS OF BREATH: 0
MYALGIAS: 0
COUGH: 0
DIARRHEA: 0
CHILLS: 0
NAUSEA: 0
SORE THROAT: 0
CONSTIPATION: 0
VOMITING: 0
EYE PAIN: 0
ABDOMINAL PAIN: 0
FEVER: 0

## 2022-02-25 NOTE — LETTER
[facility] [place]  [address] [facility] [address]     February 25, 2022    Patient: Amarilis Andrew   YOB: 2000   Date of Visit: 2/25/2022       To Whom It May Concern:    Amarilis Andrew was seen and treated in our department on 2/25/2022.     Sincerely,

## 2022-02-26 NOTE — PROGRESS NOTES
"Subjective:   Amarilis Andrew is a 21 y.o. female who presents for Rash (X 1 week abdomen , chest  )      Is a pleasant 21-year-old female who has had on again off again itchy rash mostly on the abdomen and hands.  She has been battling this for some time and suspects possible food allergy.  She does note improvement with antihistamines.  She had an unrelated rash after experimenting with a chemical peel in her  school last week that had full resolution of symptoms.  She is not experience any tongue swelling, fevers, shortness of breath or wheezing      Review of Systems   Constitutional: Negative for chills and fever.   HENT: Negative for congestion, ear pain and sore throat.    Eyes: Negative for pain.   Respiratory: Negative for cough and shortness of breath.    Cardiovascular: Negative for chest pain.   Gastrointestinal: Negative for abdominal pain, constipation, diarrhea, nausea and vomiting.   Genitourinary: Negative for dysuria.   Musculoskeletal: Negative for myalgias.   Skin: Positive for itching and rash.   Neurological: Negative for headaches.       Medications, Allergies, and current problem list reviewed today in Epic.     Objective:     BP (!) 92/60   Pulse 67   Temp 36.9 °C (98.5 °F)   Resp 12   Ht 1.626 m (5' 4\")   Wt 77.7 kg (171 lb 4.8 oz)   SpO2 100%     Physical Exam  Vitals reviewed.   Constitutional:       Appearance: Normal appearance.   HENT:      Head: Normocephalic and atraumatic.      Right Ear: External ear normal.      Left Ear: External ear normal.      Nose: Nose normal.      Mouth/Throat:      Mouth: Mucous membranes are moist.   Eyes:      Conjunctiva/sclera: Conjunctivae normal.   Cardiovascular:      Rate and Rhythm: Normal rate.   Pulmonary:      Effort: Pulmonary effort is normal.   Skin:     General: Skin is warm and dry.      Capillary Refill: Capillary refill takes less than 2 seconds.      Findings: Rash (Nonspecific maculopapular rash anterior chest.  No " oral lesions.) present.   Neurological:      Mental Status: She is alert and oriented to person, place, and time.         Assessment/Plan:     Diagnosis and associated orders:     1. Dermatitis        Comments/MDM:     • Given distribution on anterior chest could be contact dermatitis with jewelry versus foodborne dermatologic manifestation of allergy.  Recommend elimination, trial of various different foods.  Recommend OTC hydrocortisone cream, possible topical diphenhydramine cream as well as continuation of oral antihistamine therapy.  No sign of anaphylaxis or serious allergy.  Unlikely viral etiology.  No signs of more serious Isaacs-Dank's or other pathology         Differential diagnosis, natural history, supportive care, and indications for immediate follow-up discussed.    Advised the patient to follow-up with the primary care physician for recheck, reevaluation, and consideration of further management.    Please note that this dictation was created using voice recognition software. I have made a reasonable attempt to correct obvious errors, but I expect that there are errors of grammar and possibly content that I did not discover before finalizing the note.    This note was electronically signed by Scar Mac PA-C

## 2022-05-09 ENCOUNTER — HOSPITAL ENCOUNTER (EMERGENCY)
Facility: MEDICAL CENTER | Age: 22
End: 2022-05-09
Payer: OTHER GOVERNMENT

## 2022-05-09 ENCOUNTER — OFFICE VISIT (OUTPATIENT)
Dept: URGENT CARE | Facility: PHYSICIAN GROUP | Age: 22
End: 2022-05-09
Payer: OTHER GOVERNMENT

## 2022-05-09 VITALS
WEIGHT: 171 LBS | TEMPERATURE: 102 F | HEIGHT: 64 IN | BODY MASS INDEX: 29.19 KG/M2 | OXYGEN SATURATION: 94 % | SYSTOLIC BLOOD PRESSURE: 118 MMHG | HEART RATE: 76 BPM | DIASTOLIC BLOOD PRESSURE: 60 MMHG | RESPIRATION RATE: 16 BRPM

## 2022-05-09 DIAGNOSIS — R10.9 FLANK PAIN: ICD-10-CM

## 2022-05-09 DIAGNOSIS — R50.9 FEVER, UNSPECIFIED FEVER CAUSE: ICD-10-CM

## 2022-05-09 LAB
APPEARANCE UR: NORMAL
BILIRUB UR STRIP-MCNC: NEGATIVE MG/DL
COLOR UR AUTO: YELLOW
EXTERNAL QUALITY CONTROL: NORMAL
FLUAV+FLUBV AG SPEC QL IA: NEGATIVE
GLUCOSE UR STRIP.AUTO-MCNC: NEGATIVE MG/DL
INT CON NEG: NORMAL
INT CON NEG: NORMAL
INT CON POS: NORMAL
INT CON POS: NORMAL
KETONES UR STRIP.AUTO-MCNC: NEGATIVE MG/DL
LEUKOCYTE ESTERASE UR QL STRIP.AUTO: NORMAL
NITRITE UR QL STRIP.AUTO: NEGATIVE
PH UR STRIP.AUTO: 7 [PH] (ref 5–8)
POC URINE PREGNANCY TEST: NEGATIVE
PROT UR QL STRIP: NEGATIVE MG/DL
RBC UR QL AUTO: NORMAL
SARS-COV+SARS-COV-2 AG RESP QL IA.RAPID: NEGATIVE
SP GR UR STRIP.AUTO: 1.01
UROBILINOGEN UR STRIP-MCNC: 0.2 MG/DL

## 2022-05-09 PROCEDURE — 87804 INFLUENZA ASSAY W/OPTIC: CPT | Performed by: PHYSICIAN ASSISTANT

## 2022-05-09 PROCEDURE — 81025 URINE PREGNANCY TEST: CPT | Performed by: PHYSICIAN ASSISTANT

## 2022-05-09 PROCEDURE — 87426 SARSCOV CORONAVIRUS AG IA: CPT | Performed by: PHYSICIAN ASSISTANT

## 2022-05-09 PROCEDURE — 81002 URINALYSIS NONAUTO W/O SCOPE: CPT | Performed by: PHYSICIAN ASSISTANT

## 2022-05-09 PROCEDURE — 99214 OFFICE O/P EST MOD 30 MIN: CPT | Performed by: PHYSICIAN ASSISTANT

## 2022-05-09 ASSESSMENT — ENCOUNTER SYMPTOMS: FEVER: 1

## 2022-05-10 ASSESSMENT — ENCOUNTER SYMPTOMS
COUGH: 0
NAUSEA: 1
MYALGIAS: 1
FLANK PAIN: 1
ABDOMINAL PAIN: 1
SORE THROAT: 0
CHILLS: 1

## 2022-05-10 NOTE — PROGRESS NOTES
Subjective:   Amarilis Andrew is a 21 y.o. female who presents for Fever (Body chills, bad menstrual cycle, headache, started yesterday)        Patient presents with concerns of urinary urgency and frequency for the last 2 to 3 days with new onset of severe low back pain, pelvic cramping, fevers, chills, body aches for the past 24 hours.  Overall symptoms have been rapidly worsening.  States that she had similar symptoms once in the past when she had a kidney infection.  She was briefly hospitalized for this.  She denies sore throat, cough, congestion.  No known exposure to COVID-19 or influenza.  Last menstrual period 4/23/2022.  Menstrual cycle is heavier and longer than usual, but has just finished.  She has not taken any medications for symptoms.    Review of Systems   Constitutional: Positive for chills and fever.   HENT: Negative for congestion and sore throat.    Respiratory: Negative for cough.    Gastrointestinal: Positive for abdominal pain and nausea.   Genitourinary: Positive for flank pain, frequency and urgency. Negative for dysuria and hematuria.   Musculoskeletal: Positive for myalgias.       PMH:  has no past medical history on file.  MEDS:   Current Outpatient Medications:   •  Cetirizine HCl (ZYRTEC ALLERGY) 10 MG Cap, Take  by mouth., Disp: , Rfl:   •  diphenhydrAMINE (BENADRYL) 25 MG Tab, Take 25 mg by mouth every 6 hours as needed for Sleep. (Patient not taking: Reported on 2/25/2022), Disp: , Rfl:   •  COVID-19 mRNA Vaccine, Pfizer, (PFIZER-[facility] COVID-19 VACC) 30 MCG/0.3ML Suspension injection, Inject  into the shoulder, thigh, or buttocks., Disp: 0.3 mL, Rfl: 0  ALLERGIES: No Known Allergies  SURGHX:   Past Surgical History:   Procedure Laterality Date   • APPENDECTOMY  2007     SOCHX:  reports that she has never smoked. She has never used smokeless tobacco. She reports that she does not drink alcohol and does not use drugs.  FH: Family history was reviewed, no pertinent findings to  "report   Objective:   /60 (BP Location: Left arm, Patient Position: Sitting, BP Cuff Size: Adult)   Pulse 76   Temp (!) 38.9 °C (102 °F) Comment: oral  Resp 16   Ht 1.626 m (5' 4\")   Wt 77.6 kg (171 lb)   SpO2 94%   BMI 29.35 kg/m²   Physical Exam  Vitals reviewed.   Constitutional:       General: She is not in acute distress.     Appearance: Normal appearance. She is well-developed. She is not toxic-appearing.   HENT:      Head: Normocephalic and atraumatic.      Right Ear: External ear normal.      Left Ear: External ear normal.      Nose: Nose normal.   Cardiovascular:      Rate and Rhythm: Normal rate and regular rhythm.   Pulmonary:      Effort: Pulmonary effort is normal. No respiratory distress.      Breath sounds: No stridor.   Abdominal:      Tenderness: There is generalized abdominal tenderness and tenderness in the suprapubic area. There is right CVA tenderness and guarding. There is no left CVA tenderness or rebound.   Skin:     General: Skin is dry.   Neurological:      Comments: Alert and oriented.    Psychiatric:         Speech: Speech normal.         Behavior: Behavior normal.           Results for orders placed or performed in visit on 05/09/22   POCT Urinalysis   Result Value Ref Range    POC Color yellow Negative    POC Appearance cloudy Negative    POC Leukocyte Esterase trace Negative    POC Nitrites negative Negative    POC Urobiligen 0.2 Negative (0.2) mg/dL    POC Protein negative Negative mg/dL    POC Urine PH 7.0 5.0 - 8.0    POC Blood large Negative    POC Specific Gravity 1.015 <1.005 - >1.030    POC Ketones negative Negative mg/dL    POC Bilirubin negative Negative mg/dL    POC Glucose negative Negative mg/dL   POCT Pregnancy   Result Value Ref Range    POC Urine Pregnancy Test negative Negative    Internal Control Positive Valid     Internal Control Negative Valid    POCT Influenza A/B   Result Value Ref Range    Rapid Influenza A-B negative     Internal Control Positive " Valid     Internal Control Negative Valid    POCT SARS-COV Antigen PUMA (Symptomatic only)   Result Value Ref Range    Internal  Valid     SARS-COV ANTIGEN PUMA Negative Negative, Indeterminate, None Detected, Valid, Invalid, Pass       Assessment/Plan:   1. Flank pain    2. Fever, unspecified fever cause  - POCT Urinalysis  - POCT Pregnancy  - POCT Influenza A/B  - POCT SARS-COV Antigen PUMA (Symptomatic only)    Differential broad at this point, however, history and presentation concerning for pyelonephritis. Patient febrile at 102F.   Patient advised that pyelonephritis can potentially lead to sepsis which can be life-threatening.   Recommend additional evaluation at higher level care to further evaluate and manage.  Patient will go to [facility] ED for further evaluation and management.  Transfer center contacted to inform of patient disposition and arrival.  Safe for POV transport.    differential diagnosis, natural history, supportive care, and indications for immediate follow-up discussed.

## 2022-08-08 ENCOUNTER — HOSPITAL ENCOUNTER (OUTPATIENT)
Facility: MEDICAL CENTER | Age: 22
End: 2022-08-08
Attending: NURSE PRACTITIONER
Payer: OTHER GOVERNMENT

## 2022-08-08 ENCOUNTER — OFFICE VISIT (OUTPATIENT)
Dept: URGENT CARE | Facility: PHYSICIAN GROUP | Age: 22
End: 2022-08-08
Payer: OTHER GOVERNMENT

## 2022-08-08 VITALS
SYSTOLIC BLOOD PRESSURE: 114 MMHG | WEIGHT: 186 LBS | TEMPERATURE: 97.9 F | HEIGHT: 65 IN | RESPIRATION RATE: 16 BRPM | BODY MASS INDEX: 30.99 KG/M2 | HEART RATE: 70 BPM | DIASTOLIC BLOOD PRESSURE: 60 MMHG | OXYGEN SATURATION: 100 %

## 2022-08-08 DIAGNOSIS — J06.9 UPPER RESPIRATORY TRACT INFECTION, UNSPECIFIED TYPE: ICD-10-CM

## 2022-08-08 DIAGNOSIS — J02.9 PHARYNGITIS, UNSPECIFIED ETIOLOGY: ICD-10-CM

## 2022-08-08 DIAGNOSIS — Z20.822 SUSPECTED COVID-19 VIRUS INFECTION: ICD-10-CM

## 2022-08-08 DIAGNOSIS — J02.9 PHARYNGITIS, UNSPECIFIED ETIOLOGY: Primary | ICD-10-CM

## 2022-08-08 LAB
EXTERNAL QUALITY CONTROL: NORMAL
INT CON NEG: NEGATIVE
INT CON POS: POSITIVE
S PYO AG THROAT QL: NEGATIVE
SARS-COV+SARS-COV-2 AG RESP QL IA.RAPID: NEGATIVE

## 2022-08-08 PROCEDURE — 99213 OFFICE O/P EST LOW 20 MIN: CPT | Mod: 25,CS | Performed by: NURSE PRACTITIONER

## 2022-08-08 PROCEDURE — 87426 SARSCOV CORONAVIRUS AG IA: CPT | Performed by: NURSE PRACTITIONER

## 2022-08-08 PROCEDURE — 87070 CULTURE OTHR SPECIMN AEROBIC: CPT

## 2022-08-08 PROCEDURE — 87880 STREP A ASSAY W/OPTIC: CPT | Performed by: NURSE PRACTITIONER

## 2022-08-08 ASSESSMENT — ENCOUNTER SYMPTOMS
CHILLS: 0
HEADACHES: 0
SORE THROAT: 1
FEVER: 0
SPUTUM PRODUCTION: 0
WHEEZING: 0
COUGH: 1
MYALGIAS: 0
SHORTNESS OF BREATH: 0
HEMOPTYSIS: 0

## 2022-08-08 NOTE — LETTER
August 8, 2022    To Whom It May Concern:         This is confirmation that Amarilis CANELA BrittGillespie attended her scheduled appointment with TINO Chandler on 8/08/22.   Please excuse her absence due to an acute illness. She may return to school on 8/12/2022 or sooner if better.        If you have any questions please do not hesitate to call me at the phone number listed below.    Sincerely,          Magdalena Solomon A.P.R.N.  [phone]

## 2022-08-08 NOTE — PROGRESS NOTES
Subjective:     Amarilis Andrew is a 22 y.o. female who presents for Sore Throat (X 4 days with nasal congestion, yellow mucus and states that she is about 4 weeks pregnant )      HPI  Pt presents for evaluation of a new problem. Amarilis is a pleasant 22-year-old female who presents to urgent care today with complaints of a sore throat, cough, congestion that is been ongoing for the past 4 days.  She notes that her entire household is ill with similar symptoms.  She states that she has been unable to take any medication for her symptoms as she is approximately 4 weeks pregnant.  This is her first pregnancy.  She states that she took a COVID test at home 2 days ago which came back negative.  At this time, her sore throat is her worst symptom.  She does note worsening pain with swallowing.  She denies any known fever, chills, nausea/vomiting or body aches.  She is suffering from diarrhea at this time.    Review of Systems   Constitutional: Positive for malaise/fatigue. Negative for chills and fever.   HENT: Positive for congestion and sore throat.    Respiratory: Positive for cough. Negative for hemoptysis, sputum production, shortness of breath and wheezing.    Musculoskeletal: Negative for myalgias.   Neurological: Negative for headaches.       PMH: History reviewed. No pertinent past medical history.  ALLERGIES: No Known Allergies  SURGHX:   Past Surgical History:   Procedure Laterality Date   • APPENDECTOMY  2007     SOCHX:   Social History     Socioeconomic History   • Marital status:    Tobacco Use   • Smoking status: Never Smoker   • Smokeless tobacco: Never Used   Vaping Use   • Vaping Use: Never used   Substance and Sexual Activity   • Alcohol use: No   • Drug use: No     FH:   Family History   Problem Relation Age of Onset   • No Known Problems Mother    • No Known Problems Father    • Hypertension Maternal Grandmother    • Cancer Maternal Grandfather         thyroid   • Hypertension Maternal  "Grandfather          Objective:   /60 (BP Location: Right arm, Patient Position: Sitting, BP Cuff Size: Adult)   Pulse 70   Temp 36.6 °C (97.9 °F) (Temporal)   Resp 16   Ht 1.651 m (5' 5\")   Wt 84.4 kg (186 lb)   SpO2 100%   BMI 30.95 kg/m²     Physical Exam  Vitals and nursing note reviewed.   Constitutional:       General: She is not in acute distress.     Appearance: Normal appearance. She is ill-appearing.   HENT:      Head: Normocephalic and atraumatic.      Right Ear: Tympanic membrane, ear canal and external ear normal. There is no impacted cerumen.      Left Ear: Tympanic membrane, ear canal and external ear normal. There is no impacted cerumen.      Nose: Congestion and rhinorrhea present.      Mouth/Throat:      Mouth: Mucous membranes are moist.      Pharynx: No oropharyngeal exudate or posterior oropharyngeal erythema.   Eyes:      Extraocular Movements: Extraocular movements intact.      Pupils: Pupils are equal, round, and reactive to light.   Cardiovascular:      Rate and Rhythm: Normal rate and regular rhythm.      Pulses: Normal pulses.      Heart sounds: Normal heart sounds.   Pulmonary:      Effort: Pulmonary effort is normal. No respiratory distress.      Breath sounds: Normal breath sounds. No stridor. No wheezing, rhonchi or rales.   Chest:      Chest wall: No tenderness.   Abdominal:      General: Abdomen is flat. Bowel sounds are normal.      Palpations: Abdomen is soft.      Tenderness: There is no abdominal tenderness. There is no right CVA tenderness or left CVA tenderness.   Musculoskeletal:         General: Normal range of motion.      Cervical back: Normal range of motion and neck supple. No tenderness.   Lymphadenopathy:      Cervical: No cervical adenopathy.   Skin:     General: Skin is warm and dry.      Capillary Refill: Capillary refill takes less than 2 seconds.   Neurological:      General: No focal deficit present.      Mental Status: She is alert and oriented to " person, place, and time. Mental status is at baseline.   Psychiatric:         Mood and Affect: Mood normal.         Behavior: Behavior normal.         Thought Content: Thought content normal.         Judgment: Judgment normal.       POCT strep: negative  POCT COVID: negative  Assessment/Plan:   Assessment    1. Pharyngitis, unspecified etiology  POCT Rapid Strep A    CULTURE THROAT   2. Suspected COVID-19 virus infection  POCT SARS-COV Antigen PUMA (Symptomatic only)   3. Upper respiratory tract infection, unspecified type     Her symptoms are likely viral at this time.   We discussed supportive measures including humidifier, warm salt water gargles, over-the-counter Cepacol throat lozenges, rest  and increased fluids. Pt was encouraged to seek treatment back in the ER or urgent care for worsening symptoms,  fever greater than 100.5, wheezes or shortness of breath.    AVS handout given and reviewed with patient. Pt educated on red flags and when to seek treatment back in ER or UC.

## 2022-08-10 ENCOUNTER — HOSPITAL ENCOUNTER (OUTPATIENT)
Facility: MEDICAL CENTER | Age: 22
End: 2022-08-10
Attending: SPECIALIST
Payer: OTHER GOVERNMENT

## 2022-08-10 LAB
BACTERIA SPEC RESP CULT: NORMAL
SIGNIFICANT IND 70042: NORMAL
SITE SITE: NORMAL
SOURCE SOURCE: NORMAL

## 2022-08-10 PROCEDURE — 87491 CHLMYD TRACH DNA AMP PROBE: CPT

## 2022-08-10 PROCEDURE — 87591 N.GONORRHOEAE DNA AMP PROB: CPT

## 2022-08-10 PROCEDURE — 87624 HPV HI-RISK TYP POOLED RSLT: CPT

## 2022-08-10 PROCEDURE — 88175 CYTOPATH C/V AUTO FLUID REDO: CPT

## 2022-08-13 LAB — AMBIGUOUS DTTM AMBI4: NORMAL

## 2022-08-15 LAB
C TRACH DNA GENITAL QL NAA+PROBE: NEGATIVE
CYTOLOGY REG CYTOL: NORMAL
HPV HR 12 DNA CVX QL NAA+PROBE: NEGATIVE
HPV16 DNA SPEC QL NAA+PROBE: NEGATIVE
HPV18 DNA SPEC QL NAA+PROBE: NEGATIVE
N GONORRHOEA DNA GENITAL QL NAA+PROBE: NEGATIVE
SPECIMEN SOURCE: NORMAL
SPECIMEN SOURCE: NORMAL

## 2022-08-26 ENCOUNTER — HOSPITAL ENCOUNTER (OUTPATIENT)
Dept: LAB | Facility: MEDICAL CENTER | Age: 22
End: 2022-08-26
Attending: SPECIALIST
Payer: OTHER GOVERNMENT

## 2022-08-26 LAB
ABO GROUP BLD: NORMAL
BASOPHILS # BLD AUTO: 0.5 % (ref 0–1.8)
BASOPHILS # BLD: 0.04 K/UL (ref 0–0.12)
BLD GP AB SCN SERPL QL: NORMAL
EOSINOPHIL # BLD AUTO: 0.11 K/UL (ref 0–0.51)
EOSINOPHIL NFR BLD: 1.5 % (ref 0–6.9)
ERYTHROCYTE [DISTWIDTH] IN BLOOD BY AUTOMATED COUNT: 42.5 FL (ref 35.9–50)
HBV SURFACE AG SER QL: ABNORMAL
HCT VFR BLD AUTO: 40.8 % (ref 37–47)
HCV AB SER QL: NORMAL
HGB BLD-MCNC: 14 G/DL (ref 12–16)
HIV 1+2 AB+HIV1 P24 AG SERPL QL IA: NORMAL
IMM GRANULOCYTES # BLD AUTO: 0.02 K/UL (ref 0–0.11)
IMM GRANULOCYTES NFR BLD AUTO: 0.3 % (ref 0–0.9)
LYMPHOCYTES # BLD AUTO: 1.53 K/UL (ref 1–4.8)
LYMPHOCYTES NFR BLD: 20.6 % (ref 22–41)
MCH RBC QN AUTO: 31.2 PG (ref 27–33)
MCHC RBC AUTO-ENTMCNC: 34.3 G/DL (ref 33.6–35)
MCV RBC AUTO: 90.9 FL (ref 81.4–97.8)
MONOCYTES # BLD AUTO: 0.76 K/UL (ref 0–0.85)
MONOCYTES NFR BLD AUTO: 10.2 % (ref 0–13.4)
NEUTROPHILS # BLD AUTO: 4.97 K/UL (ref 2–7.15)
NEUTROPHILS NFR BLD: 66.9 % (ref 44–72)
NRBC # BLD AUTO: 0 K/UL
NRBC BLD-RTO: 0 /100 WBC
PLATELET # BLD AUTO: 269 K/UL (ref 164–446)
PMV BLD AUTO: 10.8 FL (ref 9–12.9)
RBC # BLD AUTO: 4.49 M/UL (ref 4.2–5.4)
RH BLD: NORMAL
RUBV AB SER QL: >500 IU/ML
T PALLIDUM AB SER QL IA: ABNORMAL
WBC # BLD AUTO: 7.4 K/UL (ref 4.8–10.8)

## 2022-08-26 PROCEDURE — 86900 BLOOD TYPING SEROLOGIC ABO: CPT

## 2022-08-26 PROCEDURE — 86762 RUBELLA ANTIBODY: CPT

## 2022-08-26 PROCEDURE — 86850 RBC ANTIBODY SCREEN: CPT

## 2022-08-26 PROCEDURE — 86803 HEPATITIS C AB TEST: CPT

## 2022-08-26 PROCEDURE — 86780 TREPONEMA PALLIDUM: CPT

## 2022-08-26 PROCEDURE — 36415 COLL VENOUS BLD VENIPUNCTURE: CPT

## 2022-08-26 PROCEDURE — 86901 BLOOD TYPING SEROLOGIC RH(D): CPT

## 2022-08-26 PROCEDURE — 86787 VARICELLA-ZOSTER ANTIBODY: CPT

## 2022-08-26 PROCEDURE — 87340 HEPATITIS B SURFACE AG IA: CPT

## 2022-08-26 PROCEDURE — 85025 COMPLETE CBC W/AUTO DIFF WBC: CPT

## 2022-08-26 PROCEDURE — 86592 SYPHILIS TEST NON-TREP QUAL: CPT

## 2022-08-26 PROCEDURE — 87389 HIV-1 AG W/HIV-1&-2 AB AG IA: CPT

## 2022-08-29 LAB — VZV IGG SER IA-ACNC: 80.4 IV

## 2022-09-01 ENCOUNTER — HOSPITAL ENCOUNTER (EMERGENCY)
Facility: MEDICAL CENTER | Age: 22
End: 2022-09-01
Attending: EMERGENCY MEDICINE
Payer: OTHER GOVERNMENT

## 2022-09-01 ENCOUNTER — APPOINTMENT (OUTPATIENT)
Dept: RADIOLOGY | Facility: MEDICAL CENTER | Age: 22
End: 2022-09-01
Attending: EMERGENCY MEDICINE
Payer: OTHER GOVERNMENT

## 2022-09-01 VITALS
OXYGEN SATURATION: 100 % | HEART RATE: 67 BPM | SYSTOLIC BLOOD PRESSURE: 117 MMHG | HEIGHT: 65 IN | RESPIRATION RATE: 14 BRPM | TEMPERATURE: 97.9 F | WEIGHT: 186.51 LBS | BODY MASS INDEX: 31.07 KG/M2 | DIASTOLIC BLOOD PRESSURE: 60 MMHG

## 2022-09-01 DIAGNOSIS — O03.9 MISCARRIAGE: ICD-10-CM

## 2022-09-01 DIAGNOSIS — N30.00 ACUTE CYSTITIS WITHOUT HEMATURIA: ICD-10-CM

## 2022-09-01 LAB
ALBUMIN SERPL BCP-MCNC: 4.7 G/DL (ref 3.2–4.9)
ALBUMIN/GLOB SERPL: 1.7 G/DL
ALP SERPL-CCNC: 44 U/L (ref 30–99)
ALT SERPL-CCNC: 24 U/L (ref 2–50)
AMORPH CRY #/AREA URNS HPF: PRESENT /HPF
ANION GAP SERPL CALC-SCNC: 11 MMOL/L (ref 7–16)
APPEARANCE UR: ABNORMAL
AST SERPL-CCNC: 19 U/L (ref 12–45)
B-HCG SERPL-ACNC: ABNORMAL MIU/ML (ref 0–5)
BACTERIA #/AREA URNS HPF: ABNORMAL /HPF
BASOPHILS # BLD AUTO: 0.4 % (ref 0–1.8)
BASOPHILS # BLD: 0.03 K/UL (ref 0–0.12)
BILIRUB SERPL-MCNC: 0.2 MG/DL (ref 0.1–1.5)
BILIRUB UR QL STRIP.AUTO: NEGATIVE
BUN SERPL-MCNC: 6 MG/DL (ref 8–22)
CALCIUM SERPL-MCNC: 9.3 MG/DL (ref 8.5–10.5)
CHLORIDE SERPL-SCNC: 103 MMOL/L (ref 96–112)
CO2 SERPL-SCNC: 22 MMOL/L (ref 20–33)
COLOR UR: YELLOW
CREAT SERPL-MCNC: 0.41 MG/DL (ref 0.5–1.4)
EOSINOPHIL # BLD AUTO: 0.11 K/UL (ref 0–0.51)
EOSINOPHIL NFR BLD: 1.5 % (ref 0–6.9)
EPI CELLS #/AREA URNS HPF: ABNORMAL /HPF
ERYTHROCYTE [DISTWIDTH] IN BLOOD BY AUTOMATED COUNT: 41 FL (ref 35.9–50)
GFR SERPLBLD CREATININE-BSD FMLA CKD-EPI: 141 ML/MIN/1.73 M 2
GLOBULIN SER CALC-MCNC: 2.8 G/DL (ref 1.9–3.5)
GLUCOSE SERPL-MCNC: 103 MG/DL (ref 65–99)
GLUCOSE UR STRIP.AUTO-MCNC: NEGATIVE MG/DL
HCT VFR BLD AUTO: 39.4 % (ref 37–47)
HGB BLD-MCNC: 14 G/DL (ref 12–16)
IMM GRANULOCYTES # BLD AUTO: 0.02 K/UL (ref 0–0.11)
IMM GRANULOCYTES NFR BLD AUTO: 0.3 % (ref 0–0.9)
KETONES UR STRIP.AUTO-MCNC: 40 MG/DL
LEUKOCYTE ESTERASE UR QL STRIP.AUTO: ABNORMAL
LIPASE SERPL-CCNC: 22 U/L (ref 11–82)
LYMPHOCYTES # BLD AUTO: 1.45 K/UL (ref 1–4.8)
LYMPHOCYTES NFR BLD: 20.4 % (ref 22–41)
MCH RBC QN AUTO: 31.1 PG (ref 27–33)
MCHC RBC AUTO-ENTMCNC: 35.5 G/DL (ref 33.6–35)
MCV RBC AUTO: 87.6 FL (ref 81.4–97.8)
MICRO URNS: ABNORMAL
MONOCYTES # BLD AUTO: 0.53 K/UL (ref 0–0.85)
MONOCYTES NFR BLD AUTO: 7.4 % (ref 0–13.4)
MUCOUS THREADS #/AREA URNS HPF: ABNORMAL /HPF
NEUTROPHILS # BLD AUTO: 4.98 K/UL (ref 2–7.15)
NEUTROPHILS NFR BLD: 70 % (ref 44–72)
NITRITE UR QL STRIP.AUTO: NEGATIVE
NRBC # BLD AUTO: 0 K/UL
NRBC BLD-RTO: 0 /100 WBC
NUMBER OF RH DOSES IND 8505RD: NORMAL
PH UR STRIP.AUTO: 5.5 [PH] (ref 5–8)
PLATELET # BLD AUTO: 277 K/UL (ref 164–446)
PMV BLD AUTO: 10.1 FL (ref 9–12.9)
POTASSIUM SERPL-SCNC: 3.6 MMOL/L (ref 3.6–5.5)
PROT SERPL-MCNC: 7.5 G/DL (ref 6–8.2)
PROT UR QL STRIP: NEGATIVE MG/DL
RBC # BLD AUTO: 4.5 M/UL (ref 4.2–5.4)
RBC # URNS HPF: ABNORMAL /HPF
RBC UR QL AUTO: NEGATIVE
RH BLD: NORMAL
SODIUM SERPL-SCNC: 136 MMOL/L (ref 135–145)
SP GR UR STRIP.AUTO: 1.02
UROBILINOGEN UR STRIP.AUTO-MCNC: 0.2 MG/DL
WBC # BLD AUTO: 7.1 K/UL (ref 4.8–10.8)
WBC #/AREA URNS HPF: ABNORMAL /HPF

## 2022-09-01 PROCEDURE — 84702 CHORIONIC GONADOTROPIN TEST: CPT

## 2022-09-01 PROCEDURE — 99285 EMERGENCY DEPT VISIT HI MDM: CPT

## 2022-09-01 PROCEDURE — 96374 THER/PROPH/DIAG INJ IV PUSH: CPT

## 2022-09-01 PROCEDURE — 76801 OB US < 14 WKS SINGLE FETUS: CPT

## 2022-09-01 PROCEDURE — 700105 HCHG RX REV CODE 258: Performed by: EMERGENCY MEDICINE

## 2022-09-01 PROCEDURE — 81001 URINALYSIS AUTO W/SCOPE: CPT

## 2022-09-01 PROCEDURE — 36415 COLL VENOUS BLD VENIPUNCTURE: CPT

## 2022-09-01 PROCEDURE — 86901 BLOOD TYPING SEROLOGIC RH(D): CPT

## 2022-09-01 PROCEDURE — 83690 ASSAY OF LIPASE: CPT

## 2022-09-01 PROCEDURE — 85025 COMPLETE CBC W/AUTO DIFF WBC: CPT

## 2022-09-01 PROCEDURE — 87086 URINE CULTURE/COLONY COUNT: CPT

## 2022-09-01 PROCEDURE — 80053 COMPREHEN METABOLIC PANEL: CPT

## 2022-09-01 PROCEDURE — 700111 HCHG RX REV CODE 636 W/ 250 OVERRIDE (IP): Performed by: EMERGENCY MEDICINE

## 2022-09-01 RX ORDER — CEPHALEXIN 500 MG/1
500 CAPSULE ORAL 3 TIMES DAILY
Qty: 21 CAPSULE | Refills: 0 | Status: SHIPPED | OUTPATIENT
Start: 2022-09-01 | End: 2022-09-06

## 2022-09-01 RX ORDER — CEFTRIAXONE 2 G/1
2 INJECTION, POWDER, FOR SOLUTION INTRAMUSCULAR; INTRAVENOUS ONCE
Status: COMPLETED | OUTPATIENT
Start: 2022-09-01 | End: 2022-09-01

## 2022-09-01 RX ORDER — SODIUM CHLORIDE 9 MG/ML
1000 INJECTION, SOLUTION INTRAVENOUS ONCE
Status: COMPLETED | OUTPATIENT
Start: 2022-09-01 | End: 2022-09-01

## 2022-09-01 RX ADMIN — CEFTRIAXONE SODIUM 2 G: 2 INJECTION, POWDER, FOR SOLUTION INTRAMUSCULAR; INTRAVENOUS at 18:45

## 2022-09-01 RX ADMIN — SODIUM CHLORIDE 1000 ML: 9 INJECTION, SOLUTION INTRAVENOUS at 18:45

## 2022-09-01 NOTE — ED TRIAGE NOTES
"Chief Complaint   Patient presents with    Vaginal Bleeding     Pt states she is 9 weeks pregnant and started experiencing heavy vaginal bleeding this morning. Pt states she filled two pads this morning, but bleeding has now decreased.      Pelvic Pain     7/10 pelvic pain that started this morning.      Pt ambulatory to triage with above complaints. Pt states she is receiving prenatal care and is complaint with her prenatal checkups. Pt states she did have an US confirming intrauterine pregnancy.     Protocol ordered. Pt educated on triage process, placed back in lobby, and instructed to inform staff of any changes.       /72   Pulse 96   Temp 36.6 °C (97.9 °F) (Temporal)   Resp 16   Ht 1.651 m (5' 5\")   Wt 84.6 kg (186 lb 8.2 oz)   SpO2 99%   BMI 31.04 kg/m²     "

## 2022-09-02 NOTE — ED NOTES
Pt ambulatory to room, changed into gown, urine sample collected and sent to lab. Blood collected while pt in lobby.  Chart up for ERP.

## 2022-09-02 NOTE — ED PROVIDER NOTES
ED Provider Note    CHIEF COMPLAINT  Chief Complaint   Patient presents with    Vaginal Bleeding     Pt states she is 9 weeks pregnant and started experiencing heavy vaginal bleeding this morning. Pt states she filled two pads this morning, but bleeding has now decreased.      Pelvic Pain     7/10 pelvic pain that started this morning.        HPI  Amarilis Gillespie is a 22 y.o.  female at approximately 8 to 9 weeks gestation based on first trimester ultrasound who presents with a chief complaint of vaginal bleeding and pelvic cramping that started this morning.  Patient notes that the cramping is mostly localized to her left lower quadrant and radiates to her left lower back.  She also complains of some mild dysuria but despite a history of urinary tract infections she denies that this feels similar.  She has no fevers or chills, chest pain or shortness of breath, nausea or vomiting, diarrhea.  She denies chance of sexually transmitted infection.  She has tried some Tylenol without improvement.  She is already established with Dr. Kraus OB/GYN and had her first ultrasound performed approximately 3 weeks ago.    REVIEW OF SYSTEMS  See HPI for further details.  Pelvic pain.  Vaginal bleeding.  Pregnancy.  Low back pain.  All other systems are negative.     PAST MEDICAL HISTORY       SOCIAL HISTORY  Social History     Tobacco Use    Smoking status: Never    Smokeless tobacco: Never   Vaping Use    Vaping Use: Never used   Substance and Sexual Activity    Alcohol use: No    Drug use: No    Sexual activity: Not on file       SURGICAL HISTORY   has a past surgical history that includes appendectomy ().    CURRENT MEDICATIONS  Home Medications       Reviewed by Arely Anton R.N. (Registered Nurse) on 22 at 1639  Med List Status: Partial     Medication Last Dose Status   COVID-19 mRNA Vaccine, Pfizer, (PFIZER-[place] COVID-19 VACC) 30 MCG/0.3ML Suspension injection  Active               "      ALLERGIES  No Known Allergies    PHYSICAL EXAM  VITAL SIGNS: /72   Pulse 96   Temp 36.6 °C (97.9 °F) (Temporal)   Resp 16   Ht 1.651 m (5' 5\")   Wt 84.6 kg (186 lb 8.2 oz)   SpO2 99%   BMI 31.04 kg/m²    Pulse ox interpretation: I interpret this pulse ox as normal.  Constitutional: Alert in no apparent distress.  HENT: No signs of trauma, Bilateral external ears normal, Nose normal.  Moist mucous membranes.  Eyes: Pupils are equal and reactive, Conjunctiva normal, Non-icteric.   Neck: Normal range of motion, No tenderness, Supple, No stridor.   Lymphatic: No lymphadenopathy noted.   Cardiovascular: Regular rate and rhythm, no murmurs. Pulses symmetrical.  Thorax & Lungs: Normal breath sounds, No respiratory distress, No wheezing, No chest tenderness.   Abdomen: Bowel sounds normal, Soft, bilateral lower quadrant tenderness, No masses, No pulsatile masses. No peritoneal signs.  : Normal external female genitalia.  Physiologic discharge in the vaginal vault.  No blood in the vaginal vault.  External cervical os is closed.  Mild bilateral adnexal and uterine tenderness.  No CMT.  Skin: Warm, Dry, No erythema, No rash.   Back: No bony tenderness, no CVA tenderness.   Extremities: No cyanosis.  Musculoskeletal: No major deformities noted.   Neurologic: Alert, No focal deficits noted.   Psychiatric: Affect normal, Judgment normal, Mood normal.     DIAGNOSTIC STUDIES / PROCEDURES    LABS  Results for orders placed or performed during the hospital encounter of 09/01/22   CBC WITH DIFFERENTIAL   Result Value Ref Range    WBC 7.1 4.8 - 10.8 K/uL    RBC 4.50 4.20 - 5.40 M/uL    Hemoglobin 14.0 12.0 - 16.0 g/dL    Hematocrit 39.4 37.0 - 47.0 %    MCV 87.6 81.4 - 97.8 fL    MCH 31.1 27.0 - 33.0 pg    MCHC 35.5 (H) 33.6 - 35.0 g/dL    RDW 41.0 35.9 - 50.0 fL    Platelet Count 277 164 - 446 K/uL    MPV 10.1 9.0 - 12.9 fL    Neutrophils-Polys 70.00 44.00 - 72.00 %    Lymphocytes 20.40 (L) 22.00 - 41.00 %    " Monocytes 7.40 0.00 - 13.40 %    Eosinophils 1.50 0.00 - 6.90 %    Basophils 0.40 0.00 - 1.80 %    Immature Granulocytes 0.30 0.00 - 0.90 %    Nucleated RBC 0.00 /100 WBC    Neutrophils (Absolute) 4.98 2.00 - 7.15 K/uL    Lymphs (Absolute) 1.45 1.00 - 4.80 K/uL    Monos (Absolute) 0.53 0.00 - 0.85 K/uL    Eos (Absolute) 0.11 0.00 - 0.51 K/uL    Baso (Absolute) 0.03 0.00 - 0.12 K/uL    Immature Granulocytes (abs) 0.02 0.00 - 0.11 K/uL    NRBC (Absolute) 0.00 K/uL   URINALYSIS,CULTURE IF INDICATED    Specimen: Urine   Result Value Ref Range    Micro Urine Req Microscopic      RADIOLOGY  US-OB TRANSVAGINAL ONLY    (Results Pending)       COURSE & MEDICAL DECISION MAKING  Pertinent Labs & Imaging studies reviewed. (See chart for details)  This is a christine 22-year-old  female at approximately 8-9 weeks based on first trimester ultrasound performed at her OB/GYNs office, Dr. Kraus, 3 weeks ago who is here vaginal bleeding and pelvic pain radiating to the low back.  Differential diagnosis includes, but is not limited to, ectopic pregnancy, ruptured ectopic pregnancy, urinary tract infection, threatened miscarriage, missed , incomplete ,.  Gestational hemorrhage.    Arrives afebrile with normal vital signs.  Appears well-hydrated and nontoxic.  Abdomen is soft with mild bilateral lower quadrant and suprapubic tenderness.  Pelvic exam does demonstrate bilateral adnexal and uterine tenderness without any cervical motion tenderness.  There is no significant discharge in the vaginal vault and patient denies any concern for STI.  STI testing was deferred.    CBC is reassuring without leukocytosis or anemia. Metabolic panel reveals normal electrolytes, renal, and liver function. Glucose is slightly elevated to 103. UA demonstrates ketones and suggests infection. Given a dose of ceftriaxone and IV fluids for dehydration. Patient is Rh positive, no Rhogam is indicated.    TVUS unfortunately reveals embryonic  demise. I spoke with the patient's OB/GYN, Dr. Kraus, who interestingly tells me that the patient is already aware of the miscarriage as she was seen in his office yesterday. At that time, she was given the choice of D&C or expectant management and she chose the latter. He has agreed to see the patient in his clinic tomorrow for additional management.    Patient was reevaluated at bedside. She admits to some confusion about the diagnosis from yesterday. I had a very clear discussion about the results of today's imaging and she and her  seem to understand. We discussed that she will likely continue to bleed and cramp until all products have been expelled. She can take tylenol and ibuprofen for discomfort. She will follow up with Dr. Kraus tomorrow. Discharged in good and stable condition with prescription for keflex and strict return precautions.    The patient will return for worsening symptoms and is stable at the time of discharge. The patient verbalizes understanding and will comply.    FINAL IMPRESSION  1. Miscarriage        2. Acute cystitis without hematuria  cephALEXin (KEFLEX) 500 MG Cap        Electronically signed by: Arnaldo Hays M.D., 9/1/2022 5:14 PM

## 2022-09-02 NOTE — DISCHARGE INSTRUCTIONS
You were seen in the ER for vaginal bleeding and pelvic pain in pregnancy.  Unfortunately, your ultrasound does show a miscarriage.  I spoke with Dr. Kraus and he is willing to see you in the office tomorrow.  You can take Tylenol and Motrin for pain control.  I have sent in a prescription for Keflex which is an antibiotic as you have bacteria in your urine.  Take it as directed.  I hope you feel better soon!

## 2022-09-02 NOTE — ED NOTES
PIV obtained, pt medicated per MAR. Tolerated well. Pt on monitoring. Pending US results. No other needs at this time. Significant other at bedside, call light within reach.

## 2022-09-03 NOTE — DISCHARGE PLANNING
Pt called stating there is a problem with her prescription. LESLIE spoke with Mackenzie at [facility] and Rx is written for three times a day x 5 days dispense 21 tabs. CM reviewed case with Dr Kruse and he said to make it for 7 days. Mackenzie and pt updated.

## 2022-09-04 LAB
BACTERIA UR CULT: NORMAL
SIGNIFICANT IND 70042: NORMAL
SITE SITE: NORMAL
SOURCE SOURCE: NORMAL

## 2022-09-15 ENCOUNTER — HOSPITAL ENCOUNTER (EMERGENCY)
Facility: MEDICAL CENTER | Age: 22
End: 2022-09-15
Attending: EMERGENCY MEDICINE
Payer: OTHER GOVERNMENT

## 2022-09-15 VITALS
SYSTOLIC BLOOD PRESSURE: 112 MMHG | HEIGHT: 65 IN | HEART RATE: 77 BPM | WEIGHT: 190.92 LBS | DIASTOLIC BLOOD PRESSURE: 54 MMHG | OXYGEN SATURATION: 100 % | BODY MASS INDEX: 31.81 KG/M2 | TEMPERATURE: 98.3 F | RESPIRATION RATE: 16 BRPM

## 2022-09-15 DIAGNOSIS — N39.0 URINARY TRACT INFECTION WITHOUT HEMATURIA, SITE UNSPECIFIED: ICD-10-CM

## 2022-09-15 DIAGNOSIS — O03.9 MISCARRIAGE: ICD-10-CM

## 2022-09-15 DIAGNOSIS — R10.9 ABDOMINAL CRAMPING: ICD-10-CM

## 2022-09-15 LAB
APPEARANCE UR: CLEAR
BACTERIA #/AREA URNS HPF: ABNORMAL /HPF
BILIRUB UR QL STRIP.AUTO: NEGATIVE
COLOR UR: YELLOW
EPI CELLS #/AREA URNS HPF: ABNORMAL /HPF
GLUCOSE UR STRIP.AUTO-MCNC: NEGATIVE MG/DL
HYALINE CASTS #/AREA URNS LPF: ABNORMAL /LPF
KETONES UR STRIP.AUTO-MCNC: NEGATIVE MG/DL
LEUKOCYTE ESTERASE UR QL STRIP.AUTO: ABNORMAL
MICRO URNS: ABNORMAL
NITRITE UR QL STRIP.AUTO: NEGATIVE
PH UR STRIP.AUTO: 7.5 [PH] (ref 5–8)
PROT UR QL STRIP: NEGATIVE MG/DL
RBC # URNS HPF: ABNORMAL /HPF
RBC UR QL AUTO: ABNORMAL
SP GR UR STRIP.AUTO: 1.01
UROBILINOGEN UR STRIP.AUTO-MCNC: 0.2 MG/DL
WBC #/AREA URNS HPF: ABNORMAL /HPF

## 2022-09-15 PROCEDURE — 700111 HCHG RX REV CODE 636 W/ 250 OVERRIDE (IP): Performed by: EMERGENCY MEDICINE

## 2022-09-15 PROCEDURE — 96375 TX/PRO/DX INJ NEW DRUG ADDON: CPT

## 2022-09-15 PROCEDURE — 700105 HCHG RX REV CODE 258: Performed by: EMERGENCY MEDICINE

## 2022-09-15 PROCEDURE — 96374 THER/PROPH/DIAG INJ IV PUSH: CPT

## 2022-09-15 PROCEDURE — 96376 TX/PRO/DX INJ SAME DRUG ADON: CPT

## 2022-09-15 PROCEDURE — 99285 EMERGENCY DEPT VISIT HI MDM: CPT

## 2022-09-15 PROCEDURE — 81001 URINALYSIS AUTO W/SCOPE: CPT

## 2022-09-15 RX ORDER — MORPHINE SULFATE 4 MG/ML
4 INJECTION INTRAVENOUS ONCE
Status: COMPLETED | OUTPATIENT
Start: 2022-09-15 | End: 2022-09-15

## 2022-09-15 RX ORDER — SODIUM CHLORIDE 9 MG/ML
1000 INJECTION, SOLUTION INTRAVENOUS ONCE
Status: COMPLETED | OUTPATIENT
Start: 2022-09-15 | End: 2022-09-15

## 2022-09-15 RX ORDER — OXYCODONE HYDROCHLORIDE AND ACETAMINOPHEN 5; 325 MG/1; MG/1
1 TABLET ORAL EVERY 6 HOURS PRN
Qty: 12 TABLET | Refills: 0 | Status: SHIPPED | OUTPATIENT
Start: 2022-09-15 | End: 2022-09-19

## 2022-09-15 RX ORDER — ONDANSETRON 2 MG/ML
4 INJECTION INTRAMUSCULAR; INTRAVENOUS ONCE
Status: COMPLETED | OUTPATIENT
Start: 2022-09-15 | End: 2022-09-15

## 2022-09-15 RX ORDER — NITROFURANTOIN 25; 75 MG/1; MG/1
100 CAPSULE ORAL 2 TIMES DAILY
Qty: 10 CAPSULE | Refills: 0 | Status: SHIPPED | OUTPATIENT
Start: 2022-09-15 | End: 2022-09-20

## 2022-09-15 RX ORDER — OXYCODONE HYDROCHLORIDE AND ACETAMINOPHEN 5; 325 MG/1; MG/1
1 TABLET ORAL EVERY 6 HOURS PRN
Qty: 12 TABLET | Refills: 0 | Status: SHIPPED | OUTPATIENT
Start: 2022-09-15 | End: 2022-09-15 | Stop reason: SDUPTHER

## 2022-09-15 RX ADMIN — SODIUM CHLORIDE 1000 ML: 9 INJECTION, SOLUTION INTRAVENOUS at 11:21

## 2022-09-15 RX ADMIN — MORPHINE SULFATE 4 MG: 4 INJECTION INTRAVENOUS at 10:19

## 2022-09-15 RX ADMIN — MORPHINE SULFATE 4 MG: 4 INJECTION INTRAVENOUS at 11:41

## 2022-09-15 RX ADMIN — ONDANSETRON 4 MG: 2 INJECTION INTRAMUSCULAR; INTRAVENOUS at 10:20

## 2022-09-15 ASSESSMENT — PAIN DESCRIPTION - PAIN TYPE
TYPE: ACUTE PAIN
TYPE: ACUTE PAIN

## 2022-09-15 ASSESSMENT — FIBROSIS 4 INDEX: FIB4 SCORE: 0.34

## 2022-09-15 ASSESSMENT — ENCOUNTER SYMPTOMS
ABDOMINAL PAIN: 1
BACK PAIN: 0
FEVER: 0
NAUSEA: 0
HEADACHES: 0
VOMITING: 0
DIARRHEA: 0
COUGH: 0
CONSTIPATION: 0

## 2022-09-15 NOTE — ED TRIAGE NOTES
Pt ambulated to triage with   Chief Complaint   Patient presents with    Abdominal Cramping     Seen here a wk ago and dx with miscarrage with slight bleeding.  Pt f/u with ob and started on cytotec  on last Friday.  pt having cramping, no bleeding.  Reports unable to void and swelling and itching to labia.       Pt reports also prescribed ibuprofen for pain and not touching the pain.  10/10 cramping.  Pt Informed regarding triage process and verbalized understanding to inform triage tech or RN for any changes in condition. Placed in lobby.

## 2022-09-15 NOTE — ED PROVIDER NOTES
ED Provider Note    Primary care provider: Zane Ray III, M.D.  Means of arrival: POV  History obtained from: patient  History limited by: None    CHIEF COMPLAINT  Chief Complaint   Patient presents with    Abdominal Cramping     Seen here a wk ago and dx with miscarrage with slight bleeding.  Pt f/u with ob and started on cytotec  on last Friday.  pt having cramping, no bleeding.  Reports unable to void and swelling and itching to labia.         HPI  Amarilis Gillespie is a 22 y.o. female who presents to the Emergency Department with a chief complaint of abdominal cramping.  This is in the setting of fetal demise.  Patient is approximately 8 weeks along when pregnancy growth ceased.  She saw her OB/GYN on August 31, had an ultrasound initially received this diagnosis.  He apparently, spoke to her about options including D&C versus expectant management and she chose the latter.  She was seen in the emergency department on September 1.  Had an ultrasound and contact was made with Dr. Kraus.  She was supposed to follow-up the following week which patient reports that she did try to do.  She saw Dr. Aziza Rose on September 2 he had said that he would prescribe Cytotec but the prescription never went to the pharmacy and she was unable to get a hold of or be seen in the office again until September 9 when she saw a midlevel provider who did a repeat ultrasound which did not show any changes.  She was prescribed Cytotec and took it as directed on Saturday night, September 10 and again on Sunday morning, September 11.  Since then she is experienced cramping that is not amenable to her 800 mg tablets of ibuprofen.  However, she has not experienced any passage of tissue or bleeding.  Patient is reporting irritation to her labia and pain with urination.  No fever.  No cough or cold symptoms.  She had tea this morning with soy milk but has otherwise been n.p.o.      REVIEW OF SYSTEMS  Review of Systems  "  Constitutional:  Negative for fever.   HENT:  Negative for congestion.    Respiratory:  Negative for cough.    Cardiovascular:  Negative for chest pain.   Gastrointestinal:  Positive for abdominal pain. Negative for constipation, diarrhea, nausea and vomiting.   Genitourinary:  Negative for dysuria.   Musculoskeletal:  Negative for back pain.   Neurological:  Negative for headaches.     PAST MEDICAL HISTORY       SURGICAL HISTORY   has a past surgical history that includes appendectomy (2007).    SOCIAL HISTORY  Social History     Tobacco Use    Smoking status: Never    Smokeless tobacco: Never   Vaping Use    Vaping Use: Never used   Substance Use Topics    Alcohol use: No    Drug use: No      Social History     Substance and Sexual Activity   Drug Use No       FAMILY HISTORY  Family History   Problem Relation Age of Onset    No Known Problems Mother     No Known Problems Father     Hypertension Maternal Grandmother     Cancer Maternal Grandfather         thyroid    Hypertension Maternal Grandfather        CURRENT MEDICATIONS  Home Medications       Reviewed by Dianna Montana R.N. (Registered Nurse) on 09/15/22 at 0757  Med List Status: Partial     Medication Last Dose Status   COVID-19 mRNA Vaccine, Pfizer, (PFIZER-Sprooki COVID-19 VACC) 30 MCG/0.3ML Suspension injection  Active                    ALLERGIES  No Known Allergies    PHYSICAL EXAM  VITAL SIGNS: /54   Pulse 77   Temp 36.8 °C (98.3 °F) (Temporal)   Resp 16   Ht 1.651 m (5' 5\")   Wt 86.6 kg (190 lb 14.7 oz)   SpO2 100%   BMI 31.77 kg/m²   Vitals reviewed.  Constitutional: Patient is oriented to person, place, and time. Appears well-developed and well-nourished. No distress.    Head: Normocephalic and atraumatic.   Ears: Normal external ears bilaterally.   Mouth/Throat: Oropharynx is clear and moist, no exudates.   Eyes: Conjunctivae are normal. Pupils are equal, round, and reactive to light.   Neck: Normal range of motion. Neck " supple.  Cardiovascular: Normal rate, regular rhythm and normal heart sounds. Normal peripheral pulses.  Pulmonary/Chest: Effort normal and breath sounds normal. No respiratory distress, no wheezes, rhonchi, or rales. No chest wall tenderness.  Abdominal: Soft. Bowel sounds are normal. There is no tenderness. No rebound or guarding, or peritoneal signs. No CVA tenderness.  Musculoskeletal: No edema and no tenderness.   Lymphadenopathy: No cervical adenopathy.   Neurological: No focal deficits.   Skin: Skin is warm and dry. No erythema. No pallor.   Psychiatric: Patient has a normal mood and affect.     LABS  Results for orders placed or performed during the hospital encounter of 09/15/22   URINALYSIS    Specimen: Urine, Clean Catch   Result Value Ref Range    Color Yellow     Character Clear     Specific Gravity 1.006 <1.035    Ph 7.5 5.0 - 8.0    Glucose Negative Negative mg/dL    Ketones Negative Negative mg/dL    Protein Negative Negative mg/dL    Bilirubin Negative Negative    Urobilinogen, Urine 0.2 Negative    Nitrite Negative Negative    Leukocyte Esterase Large (A) Negative    Occult Blood Trace (A) Negative    Micro Urine Req Microscopic    URINE MICROSCOPIC (W/UA)   Result Value Ref Range    WBC 20-50 (A) /hpf    RBC 2-5 (A) /hpf    Bacteria Few (A) None /hpf    Epithelial Cells Few /hpf    Hyaline Cast 0-2 /lpf       All labs reviewed by me.    COURSE & MEDICAL DECISION MAKING  Pertinent Labs & Imaging studies reviewed. (See chart for details)    Obtained and reviewed past medical records.  Patient's last encounter was an ED visit on September 1.  Patient presented with vaginal bleeding and pelvic pain.  G1, P0 female reporting to be approximately 9 weeks pregnant.  Her OB/GYN is Dr. Kraus.  Patient is noted to be Rh+.  Transvaginal ultrasound revealed embryonic demise which the patient was apparently aware of at the time, based on being seen in the office the previous day, August 31.  Plan was for  patient to be seen the following day in clinic.    8:51 AM - Patient seen and examined at bedside.  Patient seen at the bedside.  She is complaining of diffuse abdominal pain and cramping as well as dysuria and vaginal irritation.  This is in the setting of a miscarriage.  She is approximately 8 weeks pregnant.  She has fetal demise which is demonstrated on multiple prior ultrasounds including 2 in the clinic and 1 here in the emergency department.  She was treated with Cytotec over the weekend.  She has since began having cramping but has not had any vaginal bleeding or passage of tissue.  This was concerning.  She attempted to call her OB/GYN office but has not been successful, ultimately prompting her ED visit as she is not getting good control of her pain with oral NSAIDs.    9:21 AM Dr. Kraus OB/GYN paged.    9:35 AM discussed with Dr. Encarnacion who is on-call for Dr. Kraus.  She is here in the hospital about to do a .  I relayed to her, the events going back to , 2 clinic visits hospitals to ED visits and the patient's frustration with not getting help as an outpatient over the last week.  She also feels as though the Cytotec has not worked and she is amenable to D&C at this time.  She will call me back after the  she expects, and about 90 minutes with more definitive answer as to how they can proceed for possible D&C today.  Also suggested if the patient is stable which she is, that she could follow-up as an outpatient to have this scheduled.  However, I again expressed that the patient had been trying multiple times per her report, over the last week, to get in for further evaluation and she has not been successful.    1110AM patient is reevaluated at the bedside.  She she still n.p.o. and will be kept n.p.o. until further discussion with OB/GYN.  IV fluids will be initiated due to this n.p.o. status.  In addition, she is requiring additional pain medications.  She is updated on  plan that I am waiting to hear back from OB/GYN versus outpatient therapy and having her schedule for a D&C outpatient or having it done more emergently.      1134AM Dr. Encarnacion in the department. She spoke with Dr. Kraus who recommended scheduling her as outpatient D&C.  She will speak with and see the patient at bedside to explain this.      Was recontacted by Dr. Encarnacion.  She is been able to see and evaluate the patient.  She is amenable to outpatient therapy.  She will contact her this afternoon to schedule a time.  Plan at this time, is to discharge to home with oral pain medications for outpatient procedure.  Patient is stable and agreeable to this plan of care.  She is discharged in stable condition.    FINAL IMPRESSION  1. Abdominal cramping    2. Miscarriage    3. Urinary tract infection without hematuria, site unspecified

## 2022-09-15 NOTE — ED NOTES
Patient ambulated from [facility] accompanied by SO. She states she was diagnosed with a miscarriage 3 weeks ago here in the ED. She followed up with her OB shortly after and was started on Cytotec. Since then she states she has not had any vaginal bleeding or passed any products of conception. Patient is experiencing significant lower abdominal cramping, as well as new redness and swelling to her labia. She also feels that she cannot urinate, and generally feels unwell with headaches, nausea.     Chart up for ERP to see.

## 2022-09-15 NOTE — ED NOTES
Discharge information and instructions given/discussed with Pt with no with no questions at this time. Pt acknowledged information. Pt self ambulated to [facility] without difficulty, a steady gait with her significant other for their ride.

## 2022-09-15 NOTE — ED NOTES
While Pt was walking out she started to feel lightheaded and dizzy. Pt was escorted back to room and laid down. Pts vitals were rechecked to reveal all WNL.     ERP notified.

## 2022-09-15 NOTE — ED NOTES
"Pt was provided with a turkey sandwich and some water. Pt states she has not eaten anything since yesterday \"lunch\".     Pt was able to eat and drink water with no issues. Pt states she feels much better.     Pt was able to stand up and walk with out any dizziness.   "

## 2022-09-16 ENCOUNTER — PRE-ADMISSION TESTING (OUTPATIENT)
Dept: ADMISSIONS | Facility: MEDICAL CENTER | Age: 22
End: 2022-09-16
Attending: OBSTETRICS & GYNECOLOGY
Payer: OTHER GOVERNMENT

## 2022-09-16 VITALS — BODY MASS INDEX: 31.77 KG/M2 | HEIGHT: 65 IN

## 2022-09-16 RX ORDER — IBUPROFEN 800 MG/1
800 TABLET ORAL EVERY 8 HOURS PRN
COMMUNITY
End: 2023-01-03

## 2022-09-18 NOTE — H&P
GYNECOLOGY SURGERY HISTORY & PHYSICAL  Patient Name: Amarilis Gillespie Age/Sex: 22 y.o. female  : 2000 MRN: [id number]      SUBJECTIVE:  Patient is a 22 y.o.  1 para 0 female who is here for a scheduled Suction D&C due to a missed . She has failed management with misoprostol.      GYNECOLOGICAL HISTORY:  LMP: No LMP recorded. Patient is pregnant.LMP 2022  Last pap: 2022, normal    OBSTETRICAL HISTORY:  , missed     MEDICAL HISTORY:  Past Medical History:   Diagnosis Date    Pregnant        SURGICAL HISTORY:  Past Surgical History:   Procedure Laterality Date    APPENDECTOMY  2007       MEDICATIONS:  No medications prior to admission.       ALLERGIES:  No Known Allergies     SOCIAL HISTORY:   Tobacco use: no  Alcohol use: no  Recreational drug use: no   reports that she has never smoked. She has never used smokeless tobacco. She reports that she does not drink alcohol and does not use drugs.    FAMILY HISTORY:  Family History   Problem Relation Age of Onset    No Known Problems Mother     No Known Problems Father     Hypertension Maternal Grandmother     Cancer Maternal Grandfather         thyroid    Hypertension Maternal Grandfather        ROS:  A comprehensive review of systems was negative.      PHYSICAL EXAM:  There were no vitals taken for this visit.  There is no height or weight on file to calculate BMI.    General:   AAOx3  Pulmonary: Unlabored respirations  Abdomen: soft, nontender  Pelvic: Deferred to OR  DVT Evaluation: No evidence of DVT    Laboratory Data:  Hg on prenatal labs was 14.0  Blood type A+    Imagin2022: 8w3d gestation with no cardiac activity, CRL 19.2      ASSESSMENT:  This is a 22 y.o.  1 para 0010 with a missed  who presents today for a scheduled suction D&C.       PLAN:  1. Proceed to the OR  2. Preoperative antibiotics: 200mg IV doxycycline ordered  3.         Post-operative pain control with  motrin/tylenol  4. Follow up with me in 1 week      Oz Encarnacion M.D.  Obstetrics and Gynecology

## 2022-09-19 ENCOUNTER — ANESTHESIA (OUTPATIENT)
Dept: SURGERY | Facility: MEDICAL CENTER | Age: 22
End: 2022-09-19
Payer: OTHER GOVERNMENT

## 2022-09-19 ENCOUNTER — ANESTHESIA EVENT (OUTPATIENT)
Dept: SURGERY | Facility: MEDICAL CENTER | Age: 22
End: 2022-09-19
Payer: OTHER GOVERNMENT

## 2022-09-19 ENCOUNTER — HOSPITAL ENCOUNTER (OUTPATIENT)
Facility: MEDICAL CENTER | Age: 22
End: 2022-09-19
Attending: OBSTETRICS & GYNECOLOGY | Admitting: OBSTETRICS & GYNECOLOGY
Payer: OTHER GOVERNMENT

## 2022-09-19 VITALS
RESPIRATION RATE: 14 BRPM | DIASTOLIC BLOOD PRESSURE: 60 MMHG | BODY MASS INDEX: 30.85 KG/M2 | WEIGHT: 185.41 LBS | OXYGEN SATURATION: 100 % | SYSTOLIC BLOOD PRESSURE: 113 MMHG | TEMPERATURE: 97.3 F | HEART RATE: 69 BPM

## 2022-09-19 DIAGNOSIS — O02.1 MISSED ABORTION: ICD-10-CM

## 2022-09-19 LAB — PATHOLOGY CONSULT NOTE: NORMAL

## 2022-09-19 PROCEDURE — 160046 HCHG PACU - 1ST 60 MINS PHASE II: Performed by: OBSTETRICS & GYNECOLOGY

## 2022-09-19 PROCEDURE — 700105 HCHG RX REV CODE 258: Performed by: OBSTETRICS & GYNECOLOGY

## 2022-09-19 PROCEDURE — 160048 HCHG OR STATISTICAL LEVEL 1-5: Performed by: OBSTETRICS & GYNECOLOGY

## 2022-09-19 PROCEDURE — 160036 HCHG PACU - EA ADDL 30 MINS PHASE I: Performed by: OBSTETRICS & GYNECOLOGY

## 2022-09-19 PROCEDURE — 160047 HCHG PACU  - EA ADDL 30 MINS PHASE II: Performed by: OBSTETRICS & GYNECOLOGY

## 2022-09-19 PROCEDURE — 700101 HCHG RX REV CODE 250: Performed by: ANESTHESIOLOGY

## 2022-09-19 PROCEDURE — A9270 NON-COVERED ITEM OR SERVICE: HCPCS | Performed by: ANESTHESIOLOGY

## 2022-09-19 PROCEDURE — 160009 HCHG ANES TIME/MIN: Performed by: OBSTETRICS & GYNECOLOGY

## 2022-09-19 PROCEDURE — 700111 HCHG RX REV CODE 636 W/ 250 OVERRIDE (IP): Performed by: ANESTHESIOLOGY

## 2022-09-19 PROCEDURE — 160002 HCHG RECOVERY MINUTES (STAT): Performed by: OBSTETRICS & GYNECOLOGY

## 2022-09-19 PROCEDURE — 88305 TISSUE EXAM BY PATHOLOGIST: CPT

## 2022-09-19 PROCEDURE — 700105 HCHG RX REV CODE 258: Performed by: ANESTHESIOLOGY

## 2022-09-19 PROCEDURE — 160025 RECOVERY II MINUTES (STATS): Performed by: OBSTETRICS & GYNECOLOGY

## 2022-09-19 PROCEDURE — 01965 ANES INCOMPL/MISSED AB PX: CPT | Performed by: ANESTHESIOLOGY

## 2022-09-19 PROCEDURE — 160029 HCHG SURGERY MINUTES - 1ST 30 MINS LEVEL 4: Performed by: OBSTETRICS & GYNECOLOGY

## 2022-09-19 PROCEDURE — 160041 HCHG SURGERY MINUTES - EA ADDL 1 MIN LEVEL 4: Performed by: OBSTETRICS & GYNECOLOGY

## 2022-09-19 PROCEDURE — 700102 HCHG RX REV CODE 250 W/ 637 OVERRIDE(OP): Performed by: ANESTHESIOLOGY

## 2022-09-19 PROCEDURE — 160035 HCHG PACU - 1ST 60 MINS PHASE I: Performed by: OBSTETRICS & GYNECOLOGY

## 2022-09-19 RX ORDER — ONDANSETRON 2 MG/ML
4 INJECTION INTRAMUSCULAR; INTRAVENOUS
Status: DISCONTINUED | OUTPATIENT
Start: 2022-09-19 | End: 2022-09-19 | Stop reason: HOSPADM

## 2022-09-19 RX ORDER — OXYTOCIN 10 [USP'U]/ML
INJECTION, SOLUTION INTRAMUSCULAR; INTRAVENOUS
Status: DISCONTINUED
Start: 2022-09-19 | End: 2022-09-19 | Stop reason: HOSPADM

## 2022-09-19 RX ORDER — LIDOCAINE HYDROCHLORIDE 20 MG/ML
INJECTION, SOLUTION EPIDURAL; INFILTRATION; INTRACAUDAL; PERINEURAL PRN
Status: DISCONTINUED | OUTPATIENT
Start: 2022-09-19 | End: 2022-09-19 | Stop reason: SURG

## 2022-09-19 RX ORDER — MEPERIDINE HYDROCHLORIDE 25 MG/ML
25 INJECTION INTRAMUSCULAR; INTRAVENOUS; SUBCUTANEOUS
Status: DISCONTINUED | OUTPATIENT
Start: 2022-09-19 | End: 2022-09-19 | Stop reason: HOSPADM

## 2022-09-19 RX ORDER — OXYCODONE HCL 5 MG/5 ML
5 SOLUTION, ORAL ORAL
Status: COMPLETED | OUTPATIENT
Start: 2022-09-19 | End: 2022-09-19

## 2022-09-19 RX ORDER — MISOPROSTOL 200 UG/1
TABLET ORAL
Status: DISCONTINUED
Start: 2022-09-19 | End: 2022-09-19 | Stop reason: HOSPADM

## 2022-09-19 RX ORDER — ONDANSETRON 2 MG/ML
INJECTION INTRAMUSCULAR; INTRAVENOUS PRN
Status: DISCONTINUED | OUTPATIENT
Start: 2022-09-19 | End: 2022-09-19 | Stop reason: SURG

## 2022-09-19 RX ORDER — SODIUM CHLORIDE, SODIUM LACTATE, POTASSIUM CHLORIDE, CALCIUM CHLORIDE 600; 310; 30; 20 MG/100ML; MG/100ML; MG/100ML; MG/100ML
INJECTION, SOLUTION INTRAVENOUS CONTINUOUS
Status: DISCONTINUED | OUTPATIENT
Start: 2022-09-19 | End: 2022-09-19 | Stop reason: HOSPADM

## 2022-09-19 RX ORDER — PROMETHAZINE HYDROCHLORIDE 25 MG/1
12.5 SUPPOSITORY RECTAL EVERY 4 HOURS PRN
Status: DISCONTINUED | OUTPATIENT
Start: 2022-09-19 | End: 2022-09-19 | Stop reason: HOSPADM

## 2022-09-19 RX ORDER — DIPHENHYDRAMINE HYDROCHLORIDE 50 MG/ML
12.5 INJECTION INTRAMUSCULAR; INTRAVENOUS
Status: DISCONTINUED | OUTPATIENT
Start: 2022-09-19 | End: 2022-09-19 | Stop reason: HOSPADM

## 2022-09-19 RX ORDER — OXYCODONE HCL 5 MG/5 ML
10 SOLUTION, ORAL ORAL
Status: COMPLETED | OUTPATIENT
Start: 2022-09-19 | End: 2022-09-19

## 2022-09-19 RX ORDER — IPRATROPIUM BROMIDE AND ALBUTEROL SULFATE 2.5; .5 MG/3ML; MG/3ML
3 SOLUTION RESPIRATORY (INHALATION)
Status: DISCONTINUED | OUTPATIENT
Start: 2022-09-19 | End: 2022-09-19 | Stop reason: HOSPADM

## 2022-09-19 RX ORDER — MIDAZOLAM HYDROCHLORIDE 1 MG/ML
1 INJECTION INTRAMUSCULAR; INTRAVENOUS
Status: DISCONTINUED | OUTPATIENT
Start: 2022-09-19 | End: 2022-09-19 | Stop reason: HOSPADM

## 2022-09-19 RX ORDER — HYDROMORPHONE HYDROCHLORIDE 1 MG/ML
0.2 INJECTION, SOLUTION INTRAMUSCULAR; INTRAVENOUS; SUBCUTANEOUS
Status: DISCONTINUED | OUTPATIENT
Start: 2022-09-19 | End: 2022-09-19 | Stop reason: HOSPADM

## 2022-09-19 RX ORDER — DEXAMETHASONE SODIUM PHOSPHATE 4 MG/ML
INJECTION, SOLUTION INTRA-ARTICULAR; INTRALESIONAL; INTRAMUSCULAR; INTRAVENOUS; SOFT TISSUE PRN
Status: DISCONTINUED | OUTPATIENT
Start: 2022-09-19 | End: 2022-09-19 | Stop reason: SURG

## 2022-09-19 RX ORDER — DOXYCYCLINE 100 MG/10ML
INJECTION, POWDER, LYOPHILIZED, FOR SOLUTION INTRAVENOUS PRN
Status: DISCONTINUED | OUTPATIENT
Start: 2022-09-19 | End: 2022-09-19 | Stop reason: SURG

## 2022-09-19 RX ORDER — METHYLERGONOVINE MALEATE 0.2 MG/ML
INJECTION INTRAVENOUS
Status: DISCONTINUED
Start: 2022-09-19 | End: 2022-09-19 | Stop reason: HOSPADM

## 2022-09-19 RX ORDER — HYDROMORPHONE HYDROCHLORIDE 1 MG/ML
0.1 INJECTION, SOLUTION INTRAMUSCULAR; INTRAVENOUS; SUBCUTANEOUS
Status: DISCONTINUED | OUTPATIENT
Start: 2022-09-19 | End: 2022-09-19 | Stop reason: HOSPADM

## 2022-09-19 RX ORDER — SIMETHICONE 125 MG
125 TABLET,CHEWABLE ORAL 3 TIMES DAILY PRN
Status: DISCONTINUED | OUTPATIENT
Start: 2022-09-19 | End: 2022-09-19 | Stop reason: HOSPADM

## 2022-09-19 RX ORDER — HYDROMORPHONE HYDROCHLORIDE 1 MG/ML
0.5 INJECTION, SOLUTION INTRAMUSCULAR; INTRAVENOUS; SUBCUTANEOUS
Status: DISCONTINUED | OUTPATIENT
Start: 2022-09-19 | End: 2022-09-19 | Stop reason: HOSPADM

## 2022-09-19 RX ORDER — KETOROLAC TROMETHAMINE 30 MG/ML
INJECTION, SOLUTION INTRAMUSCULAR; INTRAVENOUS PRN
Status: DISCONTINUED | OUTPATIENT
Start: 2022-09-19 | End: 2022-09-19 | Stop reason: SURG

## 2022-09-19 RX ADMIN — SUGAMMADEX 160 MG: 100 INJECTION, SOLUTION INTRAVENOUS at 08:11

## 2022-09-19 RX ADMIN — FENTANYL CITRATE 100 MCG: 50 INJECTION, SOLUTION INTRAMUSCULAR; INTRAVENOUS at 07:44

## 2022-09-19 RX ADMIN — KETOROLAC TROMETHAMINE 30 MG: 30 INJECTION, SOLUTION INTRAMUSCULAR at 07:54

## 2022-09-19 RX ADMIN — PROPOFOL 150 MG: 10 INJECTION, EMULSION INTRAVENOUS at 07:44

## 2022-09-19 RX ADMIN — LIDOCAINE HYDROCHLORIDE 60 MG: 20 INJECTION, SOLUTION EPIDURAL; INFILTRATION; INTRACAUDAL at 07:44

## 2022-09-19 RX ADMIN — SODIUM CHLORIDE, POTASSIUM CHLORIDE, SODIUM LACTATE AND CALCIUM CHLORIDE: 600; 310; 30; 20 INJECTION, SOLUTION INTRAVENOUS at 07:08

## 2022-09-19 RX ADMIN — DOXYCYCLINE 200 MG: 100 INJECTION, POWDER, LYOPHILIZED, FOR SOLUTION INTRAVENOUS at 07:39

## 2022-09-19 RX ADMIN — MIDAZOLAM 2 MG: 1 INJECTION INTRAMUSCULAR; INTRAVENOUS at 07:39

## 2022-09-19 RX ADMIN — ROCURONIUM BROMIDE 30 MG: 10 INJECTION, SOLUTION INTRAVENOUS at 07:44

## 2022-09-19 RX ADMIN — ONDANSETRON 4 MG: 2 INJECTION INTRAMUSCULAR; INTRAVENOUS at 07:54

## 2022-09-19 RX ADMIN — SODIUM CHLORIDE, POTASSIUM CHLORIDE, SODIUM LACTATE AND CALCIUM CHLORIDE: 600; 310; 30; 20 INJECTION, SOLUTION INTRAVENOUS at 09:33

## 2022-09-19 RX ADMIN — FENTANYL CITRATE 50 MCG: 50 INJECTION, SOLUTION INTRAMUSCULAR; INTRAVENOUS at 08:47

## 2022-09-19 RX ADMIN — OXYCODONE HYDROCHLORIDE 10 MG: 5 SOLUTION ORAL at 09:20

## 2022-09-19 RX ADMIN — FENTANYL CITRATE 50 MCG: 50 INJECTION, SOLUTION INTRAMUSCULAR; INTRAVENOUS at 08:34

## 2022-09-19 RX ADMIN — MEPERIDINE HYDROCHLORIDE 25 MG: 25 INJECTION INTRAMUSCULAR; INTRAVENOUS; SUBCUTANEOUS at 09:04

## 2022-09-19 RX ADMIN — DEXAMETHASONE SODIUM PHOSPHATE 8 MG: 4 INJECTION, SOLUTION INTRA-ARTICULAR; INTRALESIONAL; INTRAMUSCULAR; INTRAVENOUS; SOFT TISSUE at 07:54

## 2022-09-19 ASSESSMENT — PAIN DESCRIPTION - PAIN TYPE
TYPE: SURGICAL PAIN

## 2022-09-19 ASSESSMENT — FIBROSIS 4 INDEX: FIB4 SCORE: 0.34

## 2022-09-19 ASSESSMENT — PAIN SCALES - GENERAL: PAIN_LEVEL: 1

## 2022-09-19 NOTE — OR NURSING
0937 - Pt to phase II from PACU. Report from ANGLEICA Torres.  Pt attached to monitoring, VSS, on room air.    1045 - Pt able to void    1053 - Pt stable to discharge.  Instructions given previously by ANGELICA Torres. Patient and brother verbalize understanding. IV And armbands removed.  Taken via wheelchair to car.  Pt has all belongings with them.

## 2022-09-19 NOTE — ANESTHESIA PROCEDURE NOTES
Additional History: Patient quoted $175 for cosmetic consultation Airway    Date/Time: 9/19/2022 7:45 AM  Performed by: Truong Zarate M.D.  Authorized by: Troung Zarate M.D.     Location:  OR  Urgency:  Elective  Indications for Airway Management:  Anesthesia      Spontaneous Ventilation: absent    Sedation Level:  Deep  Preoxygenated: Yes    Patient Position:  Sniffing  Final Airway Type:  Supraglottic airway  Final Supraglottic Airway:  Standard LMA    SGA Size:  3  Number of Attempts at Approach:  1

## 2022-09-19 NOTE — DISCHARGE INSTRUCTIONS
What to Expect Post Anesthesia    Rest and take it easy for the first 24 hours.  A responsible adult is recommended to remain with you during that time.  It is normal to feel sleepy.  We encourage you to not do anything that requires balance, judgment or coordination.    FOR 24 HOURS DO NOT:  Drive, operate machinery or run household appliances.  Drink beer or alcoholic beverages.  Make important decisions or sign legal documents.    Special instructions: see attached handout    To avoid nausea, slowly advance diet as tolerated, avoiding spicy or greasy foods for the first day.  Add more substantial food to your diet according to your provider's instructions.  INCREASE FLUIDS AND FIBER TO AVOID CONSTIPATION.    MILD FLU-LIKE SYMPTOMS ARE NORMAL.  YOU MAY EXPERIENCE GENERALIZED MUSCLE ACHES, THROAT IRRITATION, HEADACHE AND/OR SOME NAUSEA.    If any questions arise, call your provider.  Dr. Encarnacion's telephone #: [phone]  If your provider is not available, please feel free to call the Surgical Center at [phone].    MEDICATIONS: Resume taking daily medication.  Take prescribed pain medication with food.  If no medication is prescribed, you may take non-aspirin pain medication if needed.  PAIN MEDICATION CAN BE VERY CONSTIPATING.  Take a stool softener or laxative such as senokot, pericolace, or milk of magnesia if needed.    Last pain medication given at _____________

## 2022-09-19 NOTE — OR NURSING
0816: Patient arrived to [facility] with OR RN and anesthesia. ID confirmed. Elisa pad observed, C/I, no drainage. LMA in place, O2 via mask at 6L. VSS.  0826: Patient rousing, LMA removed, O2 via mask remains in place.   0834: Medicated per eMAR. Heat pack applied.  0847: Medicated per eMAR.   0854: O2 via mask removed, now on RA.   0900: Reports improved pain, declines additional meds at this time. Patient extremely tearful, brother brought to bedside. Tolerating sips of water.   0907: Demerol given for shivering; Jackelin Paws heater applied.   0920: Medicated per eMAR.  0930: Report to ANGELICA Garcia in Phase 2.  0937: Transferred to Phase 2.

## 2022-09-19 NOTE — ANESTHESIA PREPROCEDURE EVALUATION
Case: [id number] Date/Time: 22 0715    Procedure: SUCTION DILATION AND EVACUATION    Pre-op diagnosis: MISSED     Location: [facility] ROOM 21 / SURGERY SAME DAY [facility]    Surgeons: Oz Encarnacion M.D.          Relevant Problems   No relevant active problems       Physical Exam    Airway   Mallampati: I  TM distance: >3 FB  Neck ROM: full       Cardiovascular - normal exam  Rhythm: regular  Rate: normal  (-) murmur     Dental - normal exam           Pulmonary - normal exam  Breath sounds clear to auscultation     Abdominal    Neurological - normal exam                 Anesthesia Plan    ASA 2       Plan - general       Airway plan will be ETT          Induction: intravenous    Postoperative Plan: Postoperative administration of opioids is intended.    Pertinent diagnostic labs and testing reviewed    Informed Consent:    Anesthetic plan and risks discussed with patient.    Use of blood products discussed with: patient whom consented to blood products.

## 2022-09-19 NOTE — ANESTHESIA TIME REPORT
Anesthesia Start and Stop Event Times     Date Time Event    9/19/2022 0715 Ready for Procedure     0739 Anesthesia Start     0821 Anesthesia Stop        Responsible Staff  09/19/22    Name Role Begin End    Truong Zarate M.D. Anesth 0739 0821        Overtime Reason:  no overtime (within assigned shift)    Comments:

## 2022-09-19 NOTE — OP REPORT
SUCTION DILATION AND CURETTAGE OPERATIVE NOTE:    Patient Name: Amarilis Gillespie  YOB: 2000  MRN: [id number]    DATE OF SURGERY: 2022    PREOPERATIVE DIAGNOSIS:   1. Missed     POSTOPERATIVE DIAGNOSIS:   1. Missed     PROCEDURE: Suction Dilation and Curettage    SURGEON: Oz Encarnacion MD    FELIPE: Sage Kraus MD    ANESTHESIA: General    PREOPERATIVE ANTIBIOTICS: IV Doxycycline 200mg    ESTIMATED BLOOD LOSS: less than 50 ml    COMPLICATIONS: none patient tolerated well    SPECIMENS:   1. Products of conception    FINDINGS: 10 week sized anteverted uterus with a smooth contour, products of conception    DESCRIPTION OF PROCEDURE:   The patient was taken to the operating room where general anesthesia was administered without difficulty. She was placed in the dorsal lithotomy position with legs in Doug-type stirrups. She was prepped and draped in the normal sterile fashion. A time-out was then performed.    An exam under anesthesia revealed the above findings. A weighted speculum was inserted into the posterior aspect of the vagina, and the anterior lip of the cervix was grasped with an tenaculum clamp. The cervical os was dilated with Henry dilators so a 7-mm curved suction curette could be introduced and advanced to the uterine fundus. The suction was then started. A moderate amount of products of conception was evacuated with the curette rotating outward. The suction curette was removed. The suction curette was then reintroduced to clear the uterus of any remaining blood and products. The tenaculum clamp was removed from the cervix, and good hemostasis was noted.    The patient tolerated the procedure well. All counts were correct times two. She was awakened from general anesthesia and taken to the recovery room in stable condition. The patient will go home after recovering from anesthesia and meeting all the criteria for discharge.    Oz Encarnacion M.D.  Obstetrics  and Gynecology  9/19/2022    8:12 AM

## 2022-09-19 NOTE — ANESTHESIA POSTPROCEDURE EVALUATION
Patient: Amarilis Gillespie    Procedure Summary     Date: 22 Room / Location: [facility] ROOM 21 / SURGERY SAME DAY [facility]    Anesthesia Start: 739 Anesthesia Stop: 821    Procedure: SUCTION DILATION AND EVACUATION (Uterus) Diagnosis: (MISSED )    Surgeons: Oz Encarnacion M.D. Responsible Provider: Truong Zarate M.D.    Anesthesia Type: general ASA Status: 2          Final Anesthesia Type: general  Last vitals  BP   Blood Pressure: 106/61    Temp   36.3 °C (97.3 °F)    Pulse   73   Resp   14    SpO2   96 %      Anesthesia Post Evaluation    Patient location during evaluation: PACU  Patient participation: complete - patient participated  Level of consciousness: awake and alert  Pain score: 1    Airway patency: patent  Anesthetic complications: no  Cardiovascular status: hemodynamically stable  Respiratory status: acceptable  Hydration status: euvolemic    PONV: none          No notable events documented.     Nurse Pain Score: 0 (NPRS)

## 2022-09-19 NOTE — OR SURGEON
Immediate Post OP Note    PreOp Diagnosis: Missed       PostOp Diagnosis: Same      Procedure(s):  SUCTION DILATION AND EVACUATION - Wound Class: Clean Contaminated    Surgeon(s):  PARVEEN Monte M.D.    Anesthesiologist/Type of Anesthesia:  Anesthesiologist: Truong Zarate M.D./General    Surgical Staff:  Circulator: Kalyani Asencio R.N.  Scrub Person: Guanakito Rodriguez R.N.    Specimens removed if any:  ID Type Source Tests Collected by Time Destination   A : PRODUCTS OF CONCEPTION Tissue Products of Conception PATHOLOGY SPECIMEN Oz Encarnacion M.D. 2022  7:54 AM        Estimated Blood Loss: 50ml    Findings: Products of conception    Complications: none, patient tolerated well        2022 8:10 AM Oz Encarnacion M.D.

## 2022-11-26 RX ORDER — VALACYCLOVIR HYDROCHLORIDE 500 MG/1
500 TABLET, FILM COATED ORAL 2 TIMES DAILY
Qty: 16 TABLET | Refills: 0 | Status: SHIPPED | OUTPATIENT
Start: 2022-11-26 | End: 2022-12-04

## 2022-11-30 ENCOUNTER — OFFICE VISIT (OUTPATIENT)
Dept: URGENT CARE | Facility: PHYSICIAN GROUP | Age: 22
End: 2022-11-30
Payer: OTHER GOVERNMENT

## 2022-11-30 VITALS
OXYGEN SATURATION: 99 % | RESPIRATION RATE: 16 BRPM | TEMPERATURE: 96.8 F | BODY MASS INDEX: 32.44 KG/M2 | HEART RATE: 95 BPM | HEIGHT: 64 IN | DIASTOLIC BLOOD PRESSURE: 62 MMHG | WEIGHT: 190 LBS | SYSTOLIC BLOOD PRESSURE: 122 MMHG

## 2022-11-30 DIAGNOSIS — J02.0 ACUTE STREPTOCOCCAL PHARYNGITIS: ICD-10-CM

## 2022-11-30 DIAGNOSIS — J02.9 SORE THROAT: ICD-10-CM

## 2022-11-30 PROCEDURE — 99213 OFFICE O/P EST LOW 20 MIN: CPT | Performed by: NURSE PRACTITIONER

## 2022-11-30 RX ORDER — AMOXICILLIN 500 MG/1
500 CAPSULE ORAL 2 TIMES DAILY
Qty: 20 CAPSULE | Refills: 0 | Status: SHIPPED | OUTPATIENT
Start: 2022-11-30 | End: 2022-12-10

## 2022-11-30 ASSESSMENT — FIBROSIS 4 INDEX: FIB4 SCORE: 0.34

## 2022-12-01 ASSESSMENT — ENCOUNTER SYMPTOMS
EYE REDNESS: 0
NAUSEA: 0
MYALGIAS: 0
CHILLS: 0
SHORTNESS OF BREATH: 0
EYE DISCHARGE: 0
COUGH: 0
DIARRHEA: 0
WHEEZING: 0
CONSTIPATION: 0
NECK PAIN: 0
WEAKNESS: 0
FEVER: 0
HEADACHES: 0
SORE THROAT: 1
VOMITING: 0
ABDOMINAL PAIN: 0
DIZZINESS: 0

## 2022-12-01 NOTE — PROGRESS NOTES
Subjective     Amarilis Gillespie is a 22 y.o. female who presents with Sore Throat (X 3 days, removed stones at home)            HPI  States has been experiencing sore throat, mild nasal congestion with postnasal drainage and ear pain x3 days.  Denies fever, malaise, body aches, cough.  Salt water gargle.    PMH:  has a past medical history of Pregnant.  MEDS:   Current Outpatient Medications:     amoxicillin (AMOXIL) 500 MG Cap, Take 1 Capsule by mouth 2 times a day for 10 days., Disp: 20 Capsule, Rfl: 0    valACYclovir (VALTREX) 500 MG Tab, Take 1 Tablet by mouth 2 times a day for 8 days., Disp: 16 Tablet, Rfl: 0    ibuprofen (MOTRIN) 800 MG Tab, Take 800 mg by mouth every 8 hours as needed. (Patient not taking: Reported on 11/30/2022), Disp: , Rfl:     COVID-19 mRNA Vaccine, Pfizer, (PFIZER-BIONTECH COVID-19 VACC) 30 MCG/0.3ML Suspension injection, Inject  into the shoulder, thigh, or buttocks. (Patient not taking: Reported on 11/30/2022), Disp: 0.3 mL, Rfl: 0  ALLERGIES: No Known Allergies  SURGHX:   Past Surgical History:   Procedure Laterality Date    DILATION AND EVACUATION N/A 9/19/2022    Procedure: SUCTION DILATION AND EVACUATION;  Surgeon: Oz Encarnacion M.D.;  Location: SURGERY SAME DAY [facility];  Service: Gynecology    APPENDECTOMY  2007     SOCHX:  reports that she has never smoked. She has never used smokeless tobacco. She reports that she does not drink alcohol and does not use drugs.  FH: Family history was reviewed, no pertinent findings to report    Review of Systems   Constitutional:  Negative for chills, fever and malaise/fatigue.   HENT:  Positive for congestion, ear pain and sore throat.    Eyes:  Negative for discharge and redness.   Respiratory:  Negative for cough, shortness of breath and wheezing.    Gastrointestinal:  Negative for abdominal pain, constipation, diarrhea, nausea and vomiting.   Musculoskeletal:  Negative for myalgias and neck pain.   Skin:  Negative for itching  "and rash.   Neurological:  Negative for dizziness, weakness and headaches.   Endo/Heme/Allergies:  Negative for environmental allergies.   All other systems reviewed and are negative.           Objective     /62   Pulse 95   Temp 36 °C (96.8 °F)   Resp 16   Ht 1.626 m (5' 4\")   Wt 86.2 kg (190 lb)   SpO2 99%   BMI 32.61 kg/m²      Physical Exam  Vitals reviewed.   Constitutional:       General: She is awake. She is not in acute distress.     Appearance: Normal appearance. She is well-developed. She is not ill-appearing, toxic-appearing or diaphoretic.   HENT:      Head: Normocephalic.      Right Ear: Ear canal and external ear normal. A middle ear effusion is present.      Left Ear: Ear canal and external ear normal. A middle ear effusion is present.      Nose: Mucosal edema, congestion and rhinorrhea present.      Mouth/Throat:      Lips: Pink.      Mouth: Mucous membranes are dry.      Pharynx: Uvula midline. Pharyngeal swelling and posterior oropharyngeal erythema present. No oropharyngeal exudate or uvula swelling.      Tonsils: No tonsillar exudate or tonsillar abscesses. 1+ on the right. 1+ on the left.   Eyes:      Conjunctiva/sclera: Conjunctivae normal.      Pupils: Pupils are equal, round, and reactive to light.   Cardiovascular:      Rate and Rhythm: Normal rate.   Pulmonary:      Effort: Pulmonary effort is normal.   Musculoskeletal:         General: Normal range of motion.      Cervical back: Normal range of motion and neck supple.   Skin:     General: Skin is warm and dry.   Neurological:      Mental Status: She is alert and oriented to person, place, and time.   Psychiatric:         Attention and Perception: Attention normal.         Mood and Affect: Mood normal.         Speech: Speech normal.         Behavior: Behavior normal. Behavior is cooperative.                           Assessment & Plan        1. Acute streptococcal pharyngitis    - amoxicillin (AMOXIL) 500 MG Cap; Take 1 Capsule " by mouth 2 times a day for 10 days.  Dispense: 20 Capsule; Refill: 0    2. Sore throat    - POCT Rapid Strep A       -Maintain hydration/water intake  -May use over the counter Ibuprofen/Tylenol as needed for any fever, body aches or throat pain  -May take long acting antihistamine for seasonal allergy symptoms as needed  -May use over the counter saline nasal spray for nasal congestion as needed  -May use over the counter Nasacort/Flonase for nasal congestion as needed   -May use throat lozenges for throat discomfort as needed   -Change toothbrush after 24 hrs of initiating antibiotics   -May gargle with salt water up to 4x/day as needed for throat discomfort (1 tsp salt dissolved in 1 cup warm water)  -May drink smoothies for nutrition if too painful to swallow solid foods  -Monitor for any sinus pain/pressure with sinus congestion with thick mucus production, sinus headache, cough, shortness of breath, fever- need re-evaluation

## 2023-01-03 ENCOUNTER — OFFICE VISIT (OUTPATIENT)
Dept: URGENT CARE | Facility: PHYSICIAN GROUP | Age: 23
End: 2023-01-03
Payer: OTHER GOVERNMENT

## 2023-01-03 VITALS
BODY MASS INDEX: 31.65 KG/M2 | SYSTOLIC BLOOD PRESSURE: 112 MMHG | RESPIRATION RATE: 16 BRPM | HEIGHT: 65 IN | OXYGEN SATURATION: 99 % | HEART RATE: 80 BPM | DIASTOLIC BLOOD PRESSURE: 64 MMHG | TEMPERATURE: 98.4 F | WEIGHT: 190 LBS

## 2023-01-03 DIAGNOSIS — J01.00 ACUTE NON-RECURRENT MAXILLARY SINUSITIS: ICD-10-CM

## 2023-01-03 PROCEDURE — 99213 OFFICE O/P EST LOW 20 MIN: CPT | Performed by: FAMILY MEDICINE

## 2023-01-03 RX ORDER — AMOXICILLIN AND CLAVULANATE POTASSIUM 875; 125 MG/1; MG/1
1 TABLET, FILM COATED ORAL 2 TIMES DAILY
Qty: 10 TABLET | Refills: 0 | Status: SHIPPED | OUTPATIENT
Start: 2023-01-03 | End: 2023-01-08

## 2023-01-03 ASSESSMENT — FIBROSIS 4 INDEX: FIB4 SCORE: 0.34

## 2023-01-04 NOTE — PROGRESS NOTES
"  Subjective:      22 y.o. female presents to urgent care for cold symptoms that started 1 week ago.  She is experiencing headache, increased sinus pressure, ear pain, sore throat.  No body ache, fever, and diarrhea.  She has been using Sudafed with some relief in symptoms. She denies any tobacco product use.  No history of asthma or COPD.  She is not vaccinated against COVID.  No known sick contacts.    She denies any other questions or concerns at this time.    Current problem list, medication, and past medical/surgical history were reviewed in Epic.    ROS  See HPI     Objective:      /64 (BP Location: Left arm, Patient Position: Sitting, BP Cuff Size: Adult long)   Pulse 80   Temp 36.9 °C (98.4 °F) (Temporal)   Resp 16   Ht 1.651 m (5' 5\")   Wt 86.2 kg (190 lb)   SpO2 99%   BMI 31.62 kg/m²     Physical Exam  Constitutional:       General: She is not in acute distress.     Appearance: She is not diaphoretic.   HENT:      Right Ear: Tympanic membrane, ear canal and external ear normal.      Left Ear: Tympanic membrane, ear canal and external ear normal.      Nose:      Right Sinus: Maxillary sinus tenderness present. No frontal sinus tenderness.      Left Sinus: Maxillary sinus tenderness present. No frontal sinus tenderness.      Mouth/Throat:      Tongue: Tongue does not deviate from midline.      Palate: No lesions.      Pharynx: Uvula midline. No posterior oropharyngeal erythema.      Tonsils: No tonsillar exudate. 1+ on the right. 1+ on the left.   Cardiovascular:      Rate and Rhythm: Normal rate and regular rhythm.      Heart sounds: Normal heart sounds.   Pulmonary:      Effort: Pulmonary effort is normal. No respiratory distress.      Breath sounds: Normal breath sounds.   Neurological:      Mental Status: She is alert.   Psychiatric:         Mood and Affect: Affect normal.         Judgment: Judgment normal.     Assessment/Plan:     1. Acute non-recurrent maxillary sinusitis  Criteria for " bacterial sinusitis has been met.  Prescription for Augmentin has been sent.  Tylenol, ibuprofen, and Flonase as needed for symptomatic relief.  - amoxicillin-clavulanate (AUGMENTIN) 875-125 MG Tab; Take 1 Tablet by mouth 2 times a day for 5 days.  Dispense: 10 Tablet; Refill: 0      Instructed to return to Urgent Care or nearest Emergency Department if symptoms fail to improve, for any change in condition, further concerns, or new concerning symptoms. Patient states understanding of the plan of care and discharge instructions.    Alexia Faith M.D.

## 2023-01-05 ENCOUNTER — HOSPITAL ENCOUNTER (OUTPATIENT)
Dept: LAB | Facility: MEDICAL CENTER | Age: 23
End: 2023-01-05
Attending: NURSE PRACTITIONER
Payer: OTHER GOVERNMENT

## 2023-01-05 ENCOUNTER — OFFICE VISIT (OUTPATIENT)
Dept: MEDICAL GROUP | Facility: PHYSICIAN GROUP | Age: 23
End: 2023-01-05
Payer: COMMERCIAL

## 2023-01-05 VITALS
SYSTOLIC BLOOD PRESSURE: 124 MMHG | HEART RATE: 108 BPM | BODY MASS INDEX: 35.17 KG/M2 | HEIGHT: 64 IN | DIASTOLIC BLOOD PRESSURE: 84 MMHG | TEMPERATURE: 97.4 F | WEIGHT: 206 LBS | OXYGEN SATURATION: 97 %

## 2023-01-05 DIAGNOSIS — N92.6 MENSTRUAL PERIODS IRREGULAR: ICD-10-CM

## 2023-01-05 DIAGNOSIS — Z13.228 SCREENING FOR METABOLIC DISORDER: ICD-10-CM

## 2023-01-05 DIAGNOSIS — F32.0 CURRENT MILD EPISODE OF MAJOR DEPRESSIVE DISORDER, UNSPECIFIED WHETHER RECURRENT (HCC): ICD-10-CM

## 2023-01-05 DIAGNOSIS — E66.9 OBESITY (BMI 30-39.9): ICD-10-CM

## 2023-01-05 LAB
EST. AVERAGE GLUCOSE BLD GHB EST-MCNC: 97 MG/DL
HBA1C MFR BLD: 5 % (ref 4–5.6)
T3FREE SERPL-MCNC: 2.96 PG/ML (ref 2–4.4)
T4 FREE SERPL-MCNC: 1.15 NG/DL (ref 0.93–1.7)
TSH SERPL DL<=0.005 MIU/L-ACNC: 0.96 UIU/ML (ref 0.38–5.33)

## 2023-01-05 PROCEDURE — 99214 OFFICE O/P EST MOD 30 MIN: CPT | Performed by: NURSE PRACTITIONER

## 2023-01-05 PROCEDURE — 84439 ASSAY OF FREE THYROXINE: CPT

## 2023-01-05 PROCEDURE — 83036 HEMOGLOBIN GLYCOSYLATED A1C: CPT

## 2023-01-05 PROCEDURE — 36415 COLL VENOUS BLD VENIPUNCTURE: CPT

## 2023-01-05 PROCEDURE — 84443 ASSAY THYROID STIM HORMONE: CPT

## 2023-01-05 PROCEDURE — 84481 FREE ASSAY (FT-3): CPT

## 2023-01-05 ASSESSMENT — ENCOUNTER SYMPTOMS
MUSCULOSKELETAL NEGATIVE: 1
PSYCHIATRIC NEGATIVE: 1
NEUROLOGICAL NEGATIVE: 1
COUGH: 0
EYES NEGATIVE: 1
SPUTUM PRODUCTION: 0
SHORTNESS OF BREATH: 0
GASTROINTESTINAL NEGATIVE: 1
PALPITATIONS: 0
FEVER: 0
CONSTITUTIONAL NEGATIVE: 1

## 2023-01-05 ASSESSMENT — PATIENT HEALTH QUESTIONNAIRE - PHQ9
2. FEELING DOWN, DEPRESSED, IRRITABLE, OR HOPELESS: NOT AT ALL
9. THOUGHTS THAT YOU WOULD BE BETTER OFF DEAD, OR OF HURTING YOURSELF: NOT AT ALL
4. FEELING TIRED OR HAVING LITTLE ENERGY: NOT AT ALL
CLINICAL INTERPRETATION OF PHQ2 SCORE: 0
5. POOR APPETITE OR OVEREATING: NOT AT ALL
7. TROUBLE CONCENTRATING ON THINGS, SUCH AS READING THE NEWSPAPER OR WATCHING TELEVISION: NOT AT ALL
SUM OF ALL RESPONSES TO PHQ QUESTIONS 1-9: 0
3. TROUBLE FALLING OR STAYING ASLEEP OR SLEEPING TOO MUCH: NOT AT ALL
1. LITTLE INTEREST OR PLEASURE IN DOING THINGS: NOT AT ALL
SUM OF ALL RESPONSES TO PHQ9 QUESTIONS 1 AND 2: 0
8. MOVING OR SPEAKING SO SLOWLY THAT OTHER PEOPLE COULD HAVE NOTICED. OR THE OPPOSITE, BEING SO FIGETY OR RESTLESS THAT YOU HAVE BEEN MOVING AROUND A LOT MORE THAN USUAL: NOT AT ALL
6. FEELING BAD ABOUT YOURSELF - OR THAT YOU ARE A FAILURE OR HAVE LET YOURSELF OR YOUR FAMILY DOWN: NOT AL ALL

## 2023-01-05 ASSESSMENT — FIBROSIS 4 INDEX: FIB4 SCORE: 0.34

## 2023-01-05 NOTE — PROGRESS NOTES
Subjective:     CC:    Chief Complaint   Patient presents with    Establish Care     Zane Ray.     Other     After D&C has been gaining weight patient states she has changed diet and started excising but weight has been just increasing. November 18 was last period and pregnancy test was 2 days ago and negative.         HISTORY OF THE PRESENT ILLNESS: Patient is a 22 y.o. female, here today to establish care. Prior PCP was Dr Ray. She has a past history of depression diagnosed in 2021, was prescribed Lexapro which she took for about 3 months; states she had headaches and elected to stop medication. She attended therapy for 6 months in 2021 and this really helped with depression symptoms.     Active concern today is difficulty losing weight since her miscarriage & D&C in 10/2022; had gained weight about 15lbs when she got pregnant; after D&C noticed she was not losing the added weight. She is working with a  at the gym 3x/week and has been on a meal plan the past 2 months. 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 She is also noticing increased facial hair and her period has been irregular since her miscarriage. LMP was on 11/18/2022; she had negative pregnancy test at home 2 days ago.      Patient Active Problem List   Diagnosis    Obesity (BMI 30-39.9)    Encounter to establish care    Major depressive disorder    COVID-19       Past Medical History:   Diagnosis Date    Pregnant         Current Outpatient Medications Ordered in Epic   Medication Sig Dispense Refill    amoxicillin-clavulanate (AUGMENTIN) 875-125 MG Tab Take 1 Tablet by mouth 2 times a day for 5 days. 10 Tablet 0     No current Epic-ordered facility-administered medications on file.        Past Surgical History:   Procedure Laterality Date    DILATION AND EVACUATION N/A 9/19/2022    Procedure: SUCTION DILATION AND EVACUATION;  Surgeon: Oz Encarnacion M.D.;  Location: SURGERY SAME DAY  [facility];  Service: Gynecology    APPENDECTOMY          Allergies:  Patient has no known allergies.    Health Maintenance: Completed  M1; LMP-2022, negative home pregnancy test 2 days ago  Not on birth control, she would like to get pregnant  Last PAP 2022- NILM, -ve HPV    ROS:   Review of Systems   Constitutional: Negative.  Negative for fever and malaise/fatigue.   HENT: Negative.     Eyes: Negative.    Respiratory:  Negative for cough, sputum production and shortness of breath.    Cardiovascular:  Negative for chest pain, palpitations and leg swelling.   Gastrointestinal: Negative.    Genitourinary: Negative.    Musculoskeletal: Negative.    Neurological: Negative.    Endo/Heme/Allergies: Negative.    Psychiatric/Behavioral: Negative.         Objective:     Exam: /84 (BP Location: Left arm, Patient Position: Sitting, BP Cuff Size: Adult)   Pulse (!) 108   Temp 36.3 °C (97.4 °F) (Temporal)   Ht 1.626 m (5' 4\")   Wt 93.4 kg (206 lb)   SpO2 97%  Body mass index is 35.36 kg/m².    Physical Exam  Constitutional:       Appearance: Normal appearance. She is obese.   Cardiovascular:      Rate and Rhythm: Normal rate and regular rhythm.      Pulses: Normal pulses.      Heart sounds: Normal heart sounds.   Pulmonary:      Effort: Pulmonary effort is normal.      Breath sounds: Normal breath sounds.   Musculoskeletal:         General: Normal range of motion.      Cervical back: Normal range of motion and neck supple.      Right lower leg: No edema.      Left lower leg: No edema.   Skin:     General: Skin is warm and dry.   Neurological:      General: No focal deficit present.      Mental Status: She is alert and oriented to person, place, and time.   Psychiatric:         Mood and Affect: Mood normal.         Behavior: Behavior normal.         Thought Content: Thought content normal.         Judgment: Judgment normal.     Depression Screening    Little interest or pleasure in doing things?  0 - " not at all  Feeling down, depressed , or hopeless? 0 - not at all  Patient Health Questionnaire Score: 0    If depressive symptoms identified deferred to follow up visit unless specifically addressed in assesment and plan.      Interpretation of PHQ-9 Total Score   Score Severity   1-4 Minimal Depression   5-9 Mild Depression   10-14 Moderate Depression   15-19 Moderately Severe Depression   20-27 Severe Depression     Labs: reviewed    Assessment & Plan:   22 y.o. female with the following -  1. Current mild episode of major depressive disorder, unspecified whether recurrent (HCC)  Symptoms in remission without medications. No longer attending therapy. PHQ today is 0; denies SI/self harm. She will let me know if symptoms recur.     2. Obesity (BMI 30-39.9)  3. Screening for metabolic disorder  15 lb weight gain since miscarriage in 10/2022; active in gym and on reduced calorie meal plan the past 2 months. Not seeing any weight loss. She also has other symptoms of facial hair & irregular menses concerning for PCOS vs metabolic syndrome. Will check A1C, thyroid labs. Recheck pregnancy test.  She inquired about phentermine, because she is wanting to get pregnant again I cautioned against the risk of fetal defects with this medication. We briefly discussed GLP1 medications but cost currently deterrent, Can consider metformin for treatment if PCOS is confirmed when she sees GYN.    - HEMOGLOBIN A1C; Future  - TSH; Future  - FREE THYROXINE; Future  - T3 FREE; Future    4. Menstrual periods irregular  S/p miscarriage, D&C in 10/2022 with Dr Kraus. Sustained weight gain, facial hair, irregular menses since then. Referral submitted for her to be evaluated by GYN for possible PCOS. Labs ordered today for A1C, thyroid. She is also wanting to get pregnant again and is not currently on birth control.   - Referral to Gynecology  - HCG QUANTITATIVE; Future      Return in about 1 year (around 1/5/2024) for Annual Visit.    Please  note that this dictation was created using voice recognition software. I have made every reasonable attempt to correct obvious errors, but I expect that there are errors of grammar and possibly content that I did not discover before finalizing the note.

## 2023-03-31 ENCOUNTER — OFFICE VISIT (OUTPATIENT)
Dept: URGENT CARE | Facility: PHYSICIAN GROUP | Age: 23
End: 2023-03-31
Payer: COMMERCIAL

## 2023-03-31 VITALS
OXYGEN SATURATION: 99 % | HEIGHT: 65 IN | RESPIRATION RATE: 18 BRPM | BODY MASS INDEX: 31.65 KG/M2 | WEIGHT: 190 LBS | HEART RATE: 70 BPM | SYSTOLIC BLOOD PRESSURE: 118 MMHG | DIASTOLIC BLOOD PRESSURE: 74 MMHG | TEMPERATURE: 97.2 F

## 2023-03-31 DIAGNOSIS — B34.9 VIRAL ILLNESS: ICD-10-CM

## 2023-03-31 DIAGNOSIS — R09.81 NASAL CONGESTION: ICD-10-CM

## 2023-03-31 DIAGNOSIS — J02.9 SORE THROAT: ICD-10-CM

## 2023-03-31 LAB
FLUAV RNA SPEC QL NAA+PROBE: NEGATIVE
FLUBV RNA SPEC QL NAA+PROBE: NEGATIVE
RSV RNA SPEC QL NAA+PROBE: NEGATIVE
S PYO DNA SPEC NAA+PROBE: NOT DETECTED
SARS-COV-2 RNA RESP QL NAA+PROBE: NEGATIVE

## 2023-03-31 PROCEDURE — 99213 OFFICE O/P EST LOW 20 MIN: CPT | Performed by: PHYSICIAN ASSISTANT

## 2023-03-31 PROCEDURE — 87651 STREP A DNA AMP PROBE: CPT | Performed by: PHYSICIAN ASSISTANT

## 2023-03-31 PROCEDURE — 0241U POCT CEPHEID COV-2, FLU A/B, RSV - PCR: CPT | Performed by: PHYSICIAN ASSISTANT

## 2023-03-31 ASSESSMENT — ENCOUNTER SYMPTOMS
SINUS PAIN: 1
FEVER: 0
CHILLS: 0
SORE THROAT: 1

## 2023-03-31 ASSESSMENT — FIBROSIS 4 INDEX: FIB4 SCORE: 0.34

## 2023-03-31 NOTE — PROGRESS NOTES
"  Subjective:   Amarilis Gillespie is a 22 y.o. female who presents today with   Chief Complaint   Patient presents with    Sinus Problem     Sinus congestion, sore throat, bumps in back of throat, px radiates to ears, sneezing x 3 days      Sinus Problem  This is a new problem. Episode onset: 3 days. The problem is unchanged. There has been no fever. Associated symptoms include congestion, ear pain and a sore throat. Pertinent negatives include no chills. Treatments tried: TheraFlu and Sudafed. The treatment provided mild relief.     PMH:  has a past medical history of Pregnant.  MEDS: No current outpatient medications on file.  ALLERGIES: No Known Allergies  SURGHX:   Past Surgical History:   Procedure Laterality Date    DILATION AND EVACUATION N/A 9/19/2022    Procedure: SUCTION DILATION AND EVACUATION;  Surgeon: Oz Encarnacion M.D.;  Location: SURGERY SAME DAY [facility];  Service: Gynecology    APPENDECTOMY  2007     SOCHX:  reports that she has never smoked. She has never used smokeless tobacco. She reports that she does not drink alcohol and does not use drugs.  FH: Reviewed with patient, not pertinent to this visit.     Review of Systems   Constitutional:  Negative for chills and fever.   HENT:  Positive for congestion, ear pain, sinus pain and sore throat.       Objective:   /74   Pulse 70   Temp 36.2 °C (97.2 °F) (Temporal)   Resp 18   Ht 1.651 m (5' 5\")   Wt 86.2 kg (190 lb)   SpO2 99%   Breastfeeding Unknown   BMI 31.62 kg/m²   Physical Exam  Vitals and nursing note reviewed.   Constitutional:       General: She is not in acute distress.     Appearance: Normal appearance. She is well-developed. She is not ill-appearing or toxic-appearing.   HENT:      Head: Normocephalic and atraumatic.      Right Ear: Hearing, tympanic membrane and ear canal normal.      Left Ear: Hearing, tympanic membrane and ear canal normal.      Mouth/Throat:      Mouth: Mucous membranes are moist.      " Pharynx: Uvula midline. Posterior oropharyngeal erythema present. No uvula swelling.      Tonsils: No tonsillar exudate or tonsillar abscesses.   Cardiovascular:      Rate and Rhythm: Normal rate and regular rhythm.      Heart sounds: Normal heart sounds.   Pulmonary:      Effort: Pulmonary effort is normal.      Breath sounds: Normal breath sounds. No stridor. No wheezing, rhonchi or rales.   Musculoskeletal:      Comments: Normal movement in all 4 extremities   Skin:     General: Skin is warm and dry.   Neurological:      Mental Status: She is alert.      Coordination: Coordination normal.   Psychiatric:         Mood and Affect: Mood normal.     STREP A -    COVID -   FLU -  RSV -    Assessment/Plan:   Assessment    1. Sore throat  - POCT GROUP A STREP, PCR    2. Nasal congestion  - POCT CoV-2, Flu A/B, RSV by PCR    Symptoms and presentation consistent with viral illness  at this time.  Vital signs are stable on exam today.    Patient encouraged to get plenty of rest, use OTC tylenol for pain/fever, and drink plenty of fluids.  Recommend continued use of over-the-counter symptomatic relief and trial Flonase and antihistamine.    No indication for antibiotics at this time.  Differential diagnosis, natural history, supportive care, and indications for immediate follow-up discussed.   Patient given instructions and understanding of medications and treatment.    If not improving in 3-5 days, F/U with PCP or return to  if symptoms worsen.    Patient agreeable to plan.      Please note that this dictation was created using voice recognition software. I have made every reasonable attempt to correct obvious errors, but I expect that there are errors of grammar and possibly content that I did not discover before finalizing the note.    Jamie Fernandez PA-C

## 2023-04-05 ENCOUNTER — HOSPITAL ENCOUNTER (EMERGENCY)
Facility: MEDICAL CENTER | Age: 23
End: 2023-04-06
Attending: EMERGENCY MEDICINE
Payer: COMMERCIAL

## 2023-04-05 DIAGNOSIS — E86.0 DEHYDRATION: ICD-10-CM

## 2023-04-05 DIAGNOSIS — R19.7 NAUSEA VOMITING AND DIARRHEA: ICD-10-CM

## 2023-04-05 DIAGNOSIS — R11.2 NAUSEA VOMITING AND DIARRHEA: ICD-10-CM

## 2023-04-05 DIAGNOSIS — K52.9 GASTROENTERITIS: Primary | ICD-10-CM

## 2023-04-05 LAB
BASOPHILS # BLD AUTO: 0.4 % (ref 0–1.8)
BASOPHILS # BLD: 0.04 K/UL (ref 0–0.12)
EOSINOPHIL # BLD AUTO: 0.06 K/UL (ref 0–0.51)
EOSINOPHIL NFR BLD: 0.6 % (ref 0–6.9)
ERYTHROCYTE [DISTWIDTH] IN BLOOD BY AUTOMATED COUNT: 45.1 FL (ref 35.9–50)
HCG SERPL QL: NEGATIVE
HCT VFR BLD AUTO: 46.7 % (ref 37–47)
HGB BLD-MCNC: 15.3 G/DL (ref 12–16)
IMM GRANULOCYTES # BLD AUTO: 0.04 K/UL (ref 0–0.11)
IMM GRANULOCYTES NFR BLD AUTO: 0.4 % (ref 0–0.9)
LYMPHOCYTES # BLD AUTO: 0.47 K/UL (ref 1–4.8)
LYMPHOCYTES NFR BLD: 4.5 % (ref 22–41)
MCH RBC QN AUTO: 29.6 PG (ref 27–33)
MCHC RBC AUTO-ENTMCNC: 32.8 G/DL (ref 33.6–35)
MCV RBC AUTO: 90.3 FL (ref 81.4–97.8)
MONOCYTES # BLD AUTO: 0.39 K/UL (ref 0–0.85)
MONOCYTES NFR BLD AUTO: 3.7 % (ref 0–13.4)
NEUTROPHILS # BLD AUTO: 9.43 K/UL (ref 2–7.15)
NEUTROPHILS NFR BLD: 90.4 % (ref 44–72)
NRBC # BLD AUTO: 0 K/UL
NRBC BLD-RTO: 0 /100 WBC
PLATELET # BLD AUTO: 285 K/UL (ref 164–446)
PMV BLD AUTO: 10.5 FL (ref 9–12.9)
RBC # BLD AUTO: 5.17 M/UL (ref 4.2–5.4)
WBC # BLD AUTO: 10.4 K/UL (ref 4.8–10.8)

## 2023-04-05 PROCEDURE — 83690 ASSAY OF LIPASE: CPT

## 2023-04-05 PROCEDURE — 99285 EMERGENCY DEPT VISIT HI MDM: CPT

## 2023-04-05 PROCEDURE — 80053 COMPREHEN METABOLIC PANEL: CPT

## 2023-04-05 PROCEDURE — 84703 CHORIONIC GONADOTROPIN ASSAY: CPT

## 2023-04-05 PROCEDURE — 81001 URINALYSIS AUTO W/SCOPE: CPT

## 2023-04-05 PROCEDURE — 36415 COLL VENOUS BLD VENIPUNCTURE: CPT

## 2023-04-05 PROCEDURE — 85025 COMPLETE CBC W/AUTO DIFF WBC: CPT

## 2023-04-05 RX ORDER — KETOROLAC TROMETHAMINE 30 MG/ML
30 INJECTION, SOLUTION INTRAMUSCULAR; INTRAVENOUS ONCE
Status: COMPLETED | OUTPATIENT
Start: 2023-04-06 | End: 2023-04-06

## 2023-04-05 RX ORDER — SODIUM CHLORIDE 9 MG/ML
1000 INJECTION, SOLUTION INTRAVENOUS ONCE
Status: COMPLETED | OUTPATIENT
Start: 2023-04-05 | End: 2023-04-06

## 2023-04-05 RX ORDER — ONDANSETRON 2 MG/ML
4 INJECTION INTRAMUSCULAR; INTRAVENOUS ONCE
Status: COMPLETED | OUTPATIENT
Start: 2023-04-06 | End: 2023-04-06

## 2023-04-05 RX ORDER — MORPHINE SULFATE 4 MG/ML
4 INJECTION INTRAVENOUS ONCE
Status: COMPLETED | OUTPATIENT
Start: 2023-04-06 | End: 2023-04-06

## 2023-04-05 ASSESSMENT — LIFESTYLE VARIABLES: DO YOU DRINK ALCOHOL: NO

## 2023-04-05 ASSESSMENT — FIBROSIS 4 INDEX: FIB4 SCORE: 0.34

## 2023-04-06 VITALS
OXYGEN SATURATION: 98 % | DIASTOLIC BLOOD PRESSURE: 52 MMHG | RESPIRATION RATE: 16 BRPM | BODY MASS INDEX: 36.92 KG/M2 | TEMPERATURE: 97.2 F | SYSTOLIC BLOOD PRESSURE: 94 MMHG | HEIGHT: 64 IN | WEIGHT: 216.27 LBS | HEART RATE: 80 BPM

## 2023-04-06 LAB
ALBUMIN SERPL BCP-MCNC: 4.6 G/DL (ref 3.2–4.9)
ALBUMIN/GLOB SERPL: 1.4 G/DL
ALP SERPL-CCNC: 87 U/L (ref 30–99)
ALT SERPL-CCNC: 40 U/L (ref 2–50)
ANION GAP SERPL CALC-SCNC: 14 MMOL/L (ref 7–16)
APPEARANCE UR: CLEAR
AST SERPL-CCNC: 20 U/L (ref 12–45)
BACTERIA #/AREA URNS HPF: NEGATIVE /HPF
BILIRUB SERPL-MCNC: 0.6 MG/DL (ref 0.1–1.5)
BILIRUB UR QL STRIP.AUTO: NEGATIVE
BUN SERPL-MCNC: 18 MG/DL (ref 8–22)
CALCIUM ALBUM COR SERPL-MCNC: 8.5 MG/DL (ref 8.5–10.5)
CALCIUM SERPL-MCNC: 9 MG/DL (ref 8.5–10.5)
CHLORIDE SERPL-SCNC: 104 MMOL/L (ref 96–112)
CO2 SERPL-SCNC: 22 MMOL/L (ref 20–33)
COLOR UR: YELLOW
CREAT SERPL-MCNC: 0.65 MG/DL (ref 0.5–1.4)
EPI CELLS #/AREA URNS HPF: ABNORMAL /HPF
GFR SERPLBLD CREATININE-BSD FMLA CKD-EPI: 125 ML/MIN/1.73 M 2
GLOBULIN SER CALC-MCNC: 3.4 G/DL (ref 1.9–3.5)
GLUCOSE SERPL-MCNC: 106 MG/DL (ref 65–99)
GLUCOSE UR STRIP.AUTO-MCNC: NEGATIVE MG/DL
HYALINE CASTS #/AREA URNS LPF: ABNORMAL /LPF
KETONES UR STRIP.AUTO-MCNC: ABNORMAL MG/DL
LEUKOCYTE ESTERASE UR QL STRIP.AUTO: ABNORMAL
LIPASE SERPL-CCNC: 20 U/L (ref 11–82)
MICRO URNS: ABNORMAL
NITRITE UR QL STRIP.AUTO: NEGATIVE
PH UR STRIP.AUTO: 6 [PH] (ref 5–8)
POTASSIUM SERPL-SCNC: 3.9 MMOL/L (ref 3.6–5.5)
PROT SERPL-MCNC: 8 G/DL (ref 6–8.2)
PROT UR QL STRIP: NEGATIVE MG/DL
RBC # URNS HPF: ABNORMAL /HPF
RBC UR QL AUTO: NEGATIVE
SODIUM SERPL-SCNC: 140 MMOL/L (ref 135–145)
SP GR UR STRIP.AUTO: 1.03
UROBILINOGEN UR STRIP.AUTO-MCNC: 0.2 MG/DL
WBC #/AREA URNS HPF: ABNORMAL /HPF

## 2023-04-06 PROCEDURE — 96375 TX/PRO/DX INJ NEW DRUG ADDON: CPT

## 2023-04-06 PROCEDURE — 96374 THER/PROPH/DIAG INJ IV PUSH: CPT

## 2023-04-06 PROCEDURE — 700105 HCHG RX REV CODE 258: Performed by: EMERGENCY MEDICINE

## 2023-04-06 PROCEDURE — 700102 HCHG RX REV CODE 250 W/ 637 OVERRIDE(OP): Performed by: EMERGENCY MEDICINE

## 2023-04-06 PROCEDURE — 700111 HCHG RX REV CODE 636 W/ 250 OVERRIDE (IP): Performed by: EMERGENCY MEDICINE

## 2023-04-06 PROCEDURE — A9270 NON-COVERED ITEM OR SERVICE: HCPCS | Performed by: EMERGENCY MEDICINE

## 2023-04-06 RX ORDER — IBUPROFEN 800 MG/1
800 TABLET ORAL EVERY 8 HOURS PRN
Qty: 20 TABLET | Refills: 0 | Status: SHIPPED | OUTPATIENT
Start: 2023-04-06 | End: 2023-04-09

## 2023-04-06 RX ORDER — ACETAMINOPHEN 500 MG
1000 TABLET ORAL EVERY 6 HOURS PRN
Qty: 20 TABLET | Refills: 0 | Status: SHIPPED | OUTPATIENT
Start: 2023-04-06 | End: 2023-04-10

## 2023-04-06 RX ORDER — LOPERAMIDE HYDROCHLORIDE 2 MG/1
4 CAPSULE ORAL ONCE
Status: COMPLETED | OUTPATIENT
Start: 2023-04-06 | End: 2023-04-06

## 2023-04-06 RX ORDER — ONDANSETRON 4 MG/1
4 TABLET, ORALLY DISINTEGRATING ORAL EVERY 6 HOURS PRN
Qty: 10 TABLET | Refills: 0 | Status: SHIPPED | OUTPATIENT
Start: 2023-04-06 | End: 2023-08-19

## 2023-04-06 RX ORDER — LOPERAMIDE HYDROCHLORIDE 2 MG/1
2 CAPSULE ORAL 4 TIMES DAILY PRN
Qty: 12 CAPSULE | Refills: 0 | Status: SHIPPED | OUTPATIENT
Start: 2023-04-06 | End: 2023-08-19

## 2023-04-06 RX ADMIN — KETOROLAC TROMETHAMINE 30 MG: 30 INJECTION, SOLUTION INTRAMUSCULAR at 00:09

## 2023-04-06 RX ADMIN — MORPHINE SULFATE 4 MG: 4 INJECTION INTRAVENOUS at 00:10

## 2023-04-06 RX ADMIN — SODIUM CHLORIDE 1000 ML: 9 INJECTION, SOLUTION INTRAVENOUS at 00:07

## 2023-04-06 RX ADMIN — LOPERAMIDE HYDROCHLORIDE 4 MG: 2 CAPSULE ORAL at 01:03

## 2023-04-06 RX ADMIN — ONDANSETRON HYDROCHLORIDE 4 MG: 2 SOLUTION INTRAMUSCULAR; INTRAVENOUS at 00:08

## 2023-04-06 ASSESSMENT — PAIN DESCRIPTION - PAIN TYPE: TYPE: ACUTE PAIN

## 2023-04-06 NOTE — ED NOTES
Patient walked with a steady gate at this time to [facility]. Patient changed into gown and connected to spo2 monitor, bp cuff, and given call light.

## 2023-04-06 NOTE — ED TRIAGE NOTES
"Chief Complaint   Patient presents with    Nausea/Vomiting/Diarrhea     Pt reports chills and n/v/d since 1400 today.        Pt to triage with steady gait for above complaint. Presents with chills and n/v/d with abdominal pain since 1400 today. Pt is unsure of pregnancy status.    Pt back to lobby, educated on triage process and encourage to alert staff of any changes.     /84   Pulse (!) 115   Temp 35.9 °C (96.7 °F) (Temporal)   Resp 16   Ht 1.626 m (5' 4\")   Wt 98.1 kg (216 lb 4.3 oz)   SpO2 96%   BMI 37.12 kg/m²       "

## 2023-04-06 NOTE — DISCHARGE INSTRUCTIONS
I have sent prescriptions for antinausea medicine and antidiarrheal medicine as well as pain medications to your pharmacy for you to  in the morning.  Your labs were normal and your vital signs are reassuring which is good.  This is likely a viral gastroenteritis or viral GI bug that causes nausea vomiting diarrhea.  Stay well-hydrated drink plenty of fluids, you can purchase some Pedialyte at the drugstore as well to help stay hydrated.  Come back if you have worsening symptoms or concerns.  Thank for coming in today.

## 2023-04-06 NOTE — ED NOTES
PT medicated per MAR. Rounded on pt. Pt resting comfortably in bed at this time. On vitals monitor. Respirations equal and unlabored. Vitals signs stable. No acute distress at this time. Call light within reach.

## 2023-04-06 NOTE — ED PROVIDER NOTES
"ED Provider Note    Scribed for Duc Engel by Oneil Henriquez. 4/5/2023  11:18 PM    Primary care provider: Tg Ragland D.N.P.  Means of arrival: walk in  History obtained from: Patient  History limited by: None    CHIEF COMPLAINT  Chief Complaint   Patient presents with    Nausea/Vomiting/Diarrhea     Pt reports chills and n/v/d since 1400 today.      EXTERNAL RECORDS REVIEWED  Outpatient Notes Seen in outpatient office 6 days ago for sore throat and sinus congestion and Outpatient labs & studies Strep test, COVID/flu/RSV all were negative at that time    HPI/ROS    LIMITATION TO HISTORY   Select: : None    HPI  Amarilis Gillespie is a 22 y.o. female who presents to the Emergency Department for flu like symptoms onset today. She states that she began to feel dizzy, then had episodes of nausea, vomiting and diarrhea. She describes her symptoms as \"stomach being upset\". She states that she was seen at [facility] and told that she had gallstones. She is unsure as to if she is pregnant, but is not on birth control and is sexually active. Her boyfriend is sick as well, he was seen here two days ago and she believes that she picked up whatever he had. She denies any recent travels or exotic food consumption. She denies any alcohol, drug or tobacco use.    REVIEW OF SYSTEMS  As above, all other systems reviewed and are negative.   See HPI for further details.     PAST MEDICAL HISTORY   has a past medical history of Pregnant.    SURGICAL HISTORY   has a past surgical history that includes appendectomy (2007) and dilation and evacuation (N/A, 9/19/2022).    SOCIAL HISTORY  Social History     Tobacco Use    Smoking status: Never    Smokeless tobacco: Never   Vaping Use    Vaping Use: Never used   Substance Use Topics    Alcohol use: No    Drug use: No      Social History     Substance and Sexual Activity   Drug Use No     FAMILY HISTORY  Family History   Problem Relation Age of Onset    No Known Problems " "Mother     No Known Problems Father     Hypertension Maternal Grandmother     Cancer Maternal Grandfather         thyroid    Hypertension Maternal Grandfather      CURRENT MEDICATIONS  Home Medications       Reviewed by Nataly Hilton R.N. (Registered Nurse) on 04/05/23 at 2241  Med List Status: Not Addressed     Medication Last Dose Status        Patient Negrito Taking any Medications                         ALLERGIES  No Known Allergies    PHYSICAL EXAM    VITAL SIGNS:   Vitals:    04/05/23 2231 04/05/23 2233 04/06/23 0000   BP: 136/84  116/74   Pulse: (!) 115  83   Resp: 16     Temp: 35.9 °C (96.7 °F)     TempSrc: Temporal     SpO2: 96%  97%   Weight:  98.1 kg (216 lb 4.3 oz)    Height:  1.626 m (5' 4\")        Vitals: My interpretation: normotensive, tachycardic, afebrile, not hypoxic    Reinterpretation of vitals: Improved after IV fluids    PE:   Gen: sitting comfortably, speaking clearly, appears in no acute distress   ENT: Mucous membranes moist, posterior pharynx clear, uvula midline, nares patent bilaterally   Neck: Supple, FROM  Pulmonary: Lungs are clear to auscultation bilaterally. No tachypnea  CV:  Tachycardic, no murmur appreciated, pulses 2+ in both upper and lower extremities  Abdomen: Generalized discomfort to palpation but no point tenderness, soft, ND; no rebound/guarding  : no CVA or suprapubic tenderness   Neuro: A&Ox4 (person, place, time, situation), speech fluent, gait steady, no focal deficits appreciated  Skin: No rash or lesions.  No pallor or jaundice.  No cyanosis.  Warm and dry.     DIAGNOSTIC STUDIES / PROCEDURES    LABS  Results for orders placed or performed during the hospital encounter of 04/05/23   CBC WITH DIFFERENTIAL   Result Value Ref Range    WBC 10.4 4.8 - 10.8 K/uL    RBC 5.17 4.20 - 5.40 M/uL    Hemoglobin 15.3 12.0 - 16.0 g/dL    Hematocrit 46.7 37.0 - 47.0 %    MCV 90.3 81.4 - 97.8 fL    MCH 29.6 27.0 - 33.0 pg    MCHC 32.8 (L) 33.6 - 35.0 g/dL    RDW 45.1 35.9 - 50.0 " fL    Platelet Count 285 164 - 446 K/uL    MPV 10.5 9.0 - 12.9 fL    Neutrophils-Polys 90.40 (H) 44.00 - 72.00 %    Lymphocytes 4.50 (L) 22.00 - 41.00 %    Monocytes 3.70 0.00 - 13.40 %    Eosinophils 0.60 0.00 - 6.90 %    Basophils 0.40 0.00 - 1.80 %    Immature Granulocytes 0.40 0.00 - 0.90 %    Nucleated RBC 0.00 /100 WBC    Neutrophils (Absolute) 9.43 (H) 2.00 - 7.15 K/uL    Lymphs (Absolute) 0.47 (L) 1.00 - 4.80 K/uL    Monos (Absolute) 0.39 0.00 - 0.85 K/uL    Eos (Absolute) 0.06 0.00 - 0.51 K/uL    Baso (Absolute) 0.04 0.00 - 0.12 K/uL    Immature Granulocytes (abs) 0.04 0.00 - 0.11 K/uL    NRBC (Absolute) 0.00 K/uL   COMP METABOLIC PANEL   Result Value Ref Range    Sodium 140 135 - 145 mmol/L    Potassium 3.9 3.6 - 5.5 mmol/L    Chloride 104 96 - 112 mmol/L    Co2 22 20 - 33 mmol/L    Anion Gap 14.0 7.0 - 16.0    Glucose 106 (H) 65 - 99 mg/dL    Bun 18 8 - 22 mg/dL    Creatinine 0.65 0.50 - 1.40 mg/dL    Calcium 9.0 8.5 - 10.5 mg/dL    AST(SGOT) 20 12 - 45 U/L    ALT(SGPT) 40 2 - 50 U/L    Alkaline Phosphatase 87 30 - 99 U/L    Total Bilirubin 0.6 0.1 - 1.5 mg/dL    Albumin 4.6 3.2 - 4.9 g/dL    Total Protein 8.0 6.0 - 8.2 g/dL    Globulin 3.4 1.9 - 3.5 g/dL    A-G Ratio 1.4 g/dL   LIPASE   Result Value Ref Range    Lipase 20 11 - 82 U/L   HCG QUAL SERUM   Result Value Ref Range    Beta-Hcg Qualitative Serum Negative Negative   CORRECTED CALCIUM   Result Value Ref Range    Correct Calcium 8.5 8.5 - 10.5 mg/dL   ESTIMATED GFR   Result Value Ref Range    GFR (CKD-EPI) 125 >60 mL/min/1.73 m 2      All labs reviewed by me. Labs were compared to prior labs if they were available. Significant for no leukocytosis, no anemia, normal electrolytes, normal renal function, normal liver enzymes, normal bilirubin, lipase normal, pregnancy negative    COURSE & MEDICAL DECISION MAKING  Nursing notes, VS, PMSFHx, labs, imaging, EKG reviewed in chart.    ED Observation Status? Yes; I am placing the patient in to an  observation status due to a diagnostic uncertainty as well as therapeutic intensity. Patient placed in observation status at 11:20 PM, 4/5/2023.     Observation plan is as follows: Monitor for symptom management, diagnostic studies    Upon Reevaluation, the patient's condition has: Improved; and will be discharged.    Patient discharged from ED Observation status at 12:52 AM (Time) 4/6/2023 (Date).     Ddx: Gastroenteritis, gastritis, diverticulitis    MDM: 11:18 PM Amarilis Gillespie is a 22 y.o. female who presented with acute, severe, nausea, vomiting and diarrhea, nonbloody, nonbilious that started earlier today.  Boyfriend had a similar 24-hour bug 2 days ago and she thinks she picked it up from him.  She is without any known medical history, denies a history of fever, has cramping in her abdomen that is generalized and nonspecific.  Upon arrival she is mildly tachycardic but otherwise hemodynamically stable with normal vital signs.  Physical exam is benign other than mild generalized tenderness to the abdomen without point tenderness rebound or guarding.  She underwent routine laboratory evaluation. All labs reviewed by me. Labs were compared to prior labs if they were available. Significant for no leukocytosis, no anemia, normal electrolytes, normal renal function, normal liver enzymes, normal bilirubin, lipase normal, pregnancy negative.  She was treated with IV fluids, Zofran, IV morphine and loperamide with significant improvement in symptoms.  On reevaluation patient feels improved, has a benign abdomen and normal vital signs, tachycardia resolved.  Will Rx for Zofran, loperamide, Tylenol, Motrin and close outpatient follow-up and strict return precautions were discussed with her.  She verbalized understanding and is amenable.  I have this is likely viral gastroenteritis and will improve shortly.  Encourage p.o. intake, Pedialyte and hydration.    HYDRATION: Based on the patient's presentation of  Acute Vomiting, Dehydration and Inability to take oral fluids the patient was given IV fluids. IV Hydration was used because oral hydration was not adequate alone. Upon recheck following hydration, the patient was improved.    ADDITIONAL PROBLEM LIST AND DISPOSITION    Discussion of management with other [facility] or appropriate source(s): None     Escalation of care considered, and ultimately not performed:the patient was evaluated by myself, after discussion I have recommended the patient to be discharged    Barriers to care at this time, including but not limited to: None     Decision tools and prescription drugs considered including, but not limited to: Pain Medications morphine 4 mg .    FINAL IMPRESSION  1. Gastroenteritis Acute   2. Nausea vomiting and diarrhea Acute   3. Dehydration Acute       Oneil PRUETT (Scribhipolito), am scribing for, and in the presence of, Duc Engel.    Electronically signed by: Oneil Henriquez (Alicia), 4/5/2023    IDuc personally performed the services described in this documentation, as scribed by Oneil Henriquez in my presence, and it is both accurate and complete.    The note accurately reflects work and decisions made by me.  Duc Engel  4/6/2023  12:55 AM

## 2023-04-06 NOTE — ED NOTES
Discharge instructions reviewed with patient/family.  Patient verbalizes understanding of follow up care, medication management and reasons to return to the ER. PIV Removed with no complications.

## 2023-06-19 ENCOUNTER — HOSPITAL ENCOUNTER (EMERGENCY)
Facility: MEDICAL CENTER | Age: 23
End: 2023-06-19
Attending: EMERGENCY MEDICINE
Payer: COMMERCIAL

## 2023-06-19 VITALS
HEART RATE: 99 BPM | HEIGHT: 65 IN | WEIGHT: 223.11 LBS | TEMPERATURE: 100.1 F | RESPIRATION RATE: 16 BRPM | DIASTOLIC BLOOD PRESSURE: 78 MMHG | SYSTOLIC BLOOD PRESSURE: 131 MMHG | OXYGEN SATURATION: 96 % | BODY MASS INDEX: 37.17 KG/M2

## 2023-06-19 DIAGNOSIS — B00.1 COLD SORE: ICD-10-CM

## 2023-06-19 DIAGNOSIS — J02.0 STREP PHARYNGITIS: ICD-10-CM

## 2023-06-19 LAB
FLUAV RNA SPEC QL NAA+PROBE: NEGATIVE
FLUBV RNA SPEC QL NAA+PROBE: NEGATIVE
RSV RNA SPEC QL NAA+PROBE: NEGATIVE
S PYO DNA SPEC NAA+PROBE: DETECTED
SARS-COV-2 RNA RESP QL NAA+PROBE: NOTDETECTED
SPECIMEN SOURCE: NORMAL

## 2023-06-19 PROCEDURE — C9803 HOPD COVID-19 SPEC COLLECT: HCPCS | Performed by: EMERGENCY MEDICINE

## 2023-06-19 PROCEDURE — A9270 NON-COVERED ITEM OR SERVICE: HCPCS | Performed by: EMERGENCY MEDICINE

## 2023-06-19 PROCEDURE — 700102 HCHG RX REV CODE 250 W/ 637 OVERRIDE(OP): Performed by: EMERGENCY MEDICINE

## 2023-06-19 PROCEDURE — 99283 EMERGENCY DEPT VISIT LOW MDM: CPT

## 2023-06-19 PROCEDURE — 87651 STREP A DNA AMP PROBE: CPT

## 2023-06-19 PROCEDURE — 700111 HCHG RX REV CODE 636 W/ 250 OVERRIDE (IP): Performed by: EMERGENCY MEDICINE

## 2023-06-19 PROCEDURE — 0241U HCHG SARS-COV-2 COVID-19 NFCT DS RESP RNA 4 TRGT MIC: CPT

## 2023-06-19 RX ORDER — IBUPROFEN 600 MG/1
600 TABLET ORAL ONCE
Status: COMPLETED | OUTPATIENT
Start: 2023-06-19 | End: 2023-06-19

## 2023-06-19 RX ORDER — VALACYCLOVIR HYDROCHLORIDE 1 G/1
1000 TABLET, FILM COATED ORAL 2 TIMES DAILY
Qty: 20 TABLET | Refills: 0 | Status: ACTIVE | OUTPATIENT
Start: 2023-06-19

## 2023-06-19 RX ORDER — AMOXICILLIN 875 MG/1
875 TABLET, COATED ORAL 2 TIMES DAILY
Qty: 14 TABLET | Refills: 0 | Status: ACTIVE | OUTPATIENT
Start: 2023-06-19 | End: 2023-06-26

## 2023-06-19 RX ORDER — DEXAMETHASONE SODIUM PHOSPHATE 4 MG/ML
10 INJECTION, SOLUTION INTRA-ARTICULAR; INTRALESIONAL; INTRAMUSCULAR; INTRAVENOUS; SOFT TISSUE ONCE
Status: COMPLETED | OUTPATIENT
Start: 2023-06-19 | End: 2023-06-19

## 2023-06-19 RX ORDER — AMOXICILLIN AND CLAVULANATE POTASSIUM 875; 125 MG/1; MG/1
1 TABLET, FILM COATED ORAL ONCE
Status: COMPLETED | OUTPATIENT
Start: 2023-06-19 | End: 2023-06-19

## 2023-06-19 RX ADMIN — IBUPROFEN 600 MG: 600 TABLET, FILM COATED ORAL at 14:03

## 2023-06-19 RX ADMIN — DEXAMETHASONE SODIUM PHOSPHATE 10 MG: 4 INJECTION, SOLUTION INTRAMUSCULAR; INTRAVENOUS at 15:17

## 2023-06-19 RX ADMIN — AMOXICILLIN AND CLAVULANATE POTASSIUM 1 TABLET: 875; 125 TABLET, FILM COATED ORAL at 15:17

## 2023-06-19 ASSESSMENT — FIBROSIS 4 INDEX: FIB4 SCORE: 0.27

## 2023-06-19 ASSESSMENT — LIFESTYLE VARIABLES: DO YOU DRINK ALCOHOL: NO

## 2023-06-19 NOTE — ED TRIAGE NOTES
"Chief Complaint   Patient presents with    Flu Like Symptoms     Sore throat, nasal congestion, body aches, fevers (Tmax 103) x2 days.      Pt ambulatory to triage with above complaints. Pt denies any medical history and denies chest pain and shortness of breath.     Pt educated on triage process, placed back in lobby, and instructed to inform staff of any changes.     BP (!) 142/91   Pulse (!) 113   Temp 37.8 °C (100.1 °F) (Temporal)   Resp 16   Ht 1.651 m (5' 5\")   Wt 101 kg (223 lb 1.7 oz)   SpO2 98%   BMI 37.13 kg/m²     "

## 2023-06-19 NOTE — ED NOTES
Pt ambulatory to room, Place in gown.  Pt reports having fever for past 2 days with increase sore throat, unable to swallow.  Pt also report fever blister on lips beginning this morning.

## 2023-06-19 NOTE — ED PROVIDER NOTES
ER Provider Note    Scribed for Simone Smith M.d. by Anna Hamilton. 6/19/2023  1:32 PM    Primary Care Provider: Tg Ragland D.N.P.    CHIEF COMPLAINT  Chief Complaint   Patient presents with    Flu Like Symptoms     Sore throat, nasal congestion, body aches, fevers (Tmax 103) x2 days.      EXTERNAL RECORDS REVIEWED  Outpatient Notes The patient was last seen at the ED on 04/05/2023 secondary to nausea, vomiting, and diarrhea. It was found that the patient had acute gastroenteritis.    HPI/ROS  LIMITATION TO HISTORY   Select: : None  OUTSIDE HISTORIAN(S):  None.    Amarilis Gillespie is a 22 y.o. female who presents to the ED for evaluation of flu-like symptoms onset 1 day ago. The patient states she also has a sore throat, and fever, but denies any nausea, vomiting, burning or pain with urination. The patient reports that she took Ibuprofen at 8 this morning with no alleviation. She describes that she has not had any recent sick contact.     PAST MEDICAL HISTORY  Past Medical History:   Diagnosis Date    Pregnant      SURGICAL HISTORY  Past Surgical History:   Procedure Laterality Date    DILATION AND EVACUATION N/A 9/19/2022    Procedure: SUCTION DILATION AND EVACUATION;  Surgeon: Oz Encarnacion M.D.;  Location: SURGERY SAME DAY [facility];  Service: Gynecology    APPENDECTOMY  2007     FAMILY HISTORY  Family History   Problem Relation Age of Onset    No Known Problems Mother     No Known Problems Father     Hypertension Maternal Grandmother     Cancer Maternal Grandfather         thyroid    Hypertension Maternal Grandfather      SOCIAL HISTORY   reports that she has never smoked. She has never used smokeless tobacco. She reports that she does not drink alcohol and does not use drugs.    CURRENT MEDICATIONS  Current Outpatient Medications   Medication Instructions    amoxicillin (AMOXIL) 875 mg, Oral, 2 TIMES DAILY    loperamide (IMODIUM) 2 mg, Oral, 4 TIMES DAILY PRN     "ondansetron (ZOFRAN ODT) 4 mg, Oral, EVERY 6 HOURS PRN    valacyclovir (VALTREX) 1,000 mg, Oral, 2 TIMES DAILY      ALLERGIES  Patient has no known allergies.    PHYSICAL EXAM  VITAL SIGNS: BP (!) 142/91   Pulse (!) 113   Temp 37.8 °C (100.1 °F) (Temporal)   Resp 16   Ht 1.651 m (5' 5\")   Wt 101 kg (223 lb 1.7 oz)   SpO2 98%   BMI 37.13 kg/m²     Pulse ox interpretation: I interpret this pulse ox as normal.  Constitutional: Alert in no apparent distress.  HENT: No signs of trauma, Bilateral external ears normal. Mild nasal and sinus congestion. Scattered petechiae. No enlarged tonsils or exudates. 1 cm cold sore to upper lip.   Eyes: Conjunctiva normal, Non-icteric.   Neck: Normal range of motion, Supple, No stridor.   Lymphatic: No lymphadenopathy noted.   Cardiovascular: Regular  rate and rhythm, no murmurs.   Thorax & Lungs: Normal breath sounds, No respiratory distress, No wheezing. Clear lungs.   Abdomen: Bowel sounds normal, Soft, No tenderness, No masses, No pulsatile masses. No peritoneal signs.  Skin: Warm, Dry, No erythema, No rash.   Extremities: Intact distal pulses, No edema, No cyanosis.  Musculoskeletal: Good range of motion in all major joints. No or major deformities noted.   Neurologic: Alert , Normal motor function, Normal sensory function, No focal deficits noted.   Psychiatric: Affect normal, Judgment normal, Mood normal.     DIAGNOSTIC STUDIES    Labs:   Labs Reviewed   GROUP A STREP BY PCR - Abnormal; Notable for the following components:       Result Value    Group A Strep by PCR DETECTED (*)     All other components within normal limits   COV-2, FLU A/B, AND RSV BY PCR ([facility]tera[facility])     COURSE & MEDICAL DECISION MAKING     ED Observation Status? Yes; I am placing the patient in to an observation status due to a diagnostic uncertainty as well as therapeutic intensity. Patient placed in observation status at 1:34 PM, 6/19/2023.     Observation plan is as follows: Will perform lab work for " further evaluation of her symptoms.     Upon Reevaluation, the patient's condition has: Improved; and will be discharged.    Patient discharged from ED Observation status at 3:20 PM (Time) 6/19/2023 (Date).     INITIAL ASSESSMENT, COURSE AND PLAN  Care Narrative:     1:33 PM - Patient seen and examined at bedside. Discussed plan of care, including performing lab work. Patient agrees to the plan of care. I also recommended that the patient continue using ibuprofen every 6 hours. The patient will be medicated with ibuprofen 600 mg. Ordered for CoV-2 Flu A/B and RSV PCR and Group A Strep by PCR to evaluate her symptoms.      Differential diagnoses include, but are not limited to: Flu, Covid, RSV, Strep throat.    3:09 PM -strep is positive.  The patient will be medicated with Decadron 10 mg and Augmentin 125 mg.     3:16 PM - Patient was reevaluated at bedside. Discussed lab results with the patient and informed them that they came back positive for strep. I will prescribe the patient amoxicillin and valacyclovir for at home treatment. Discussed discharge instructions and return precautions with the patient and they were cleared for discharge. Patient was given the opportunity to ask any further questions. She is comfortable with discharge at this time.           DISPOSITION AND DISCUSSIONS  I have discussed management of the patient with the following physicians and DAVID's:  None.    Discussion of management with other [facility] or appropriate source(s): None     Escalation of care considered, and ultimately not performed: IV fluids, but presenting tachycardia resolved with medication treatment.    Barriers to care at this time, including but not limited to:  None .     Decision tools and prescription drugs considered including, but not limited to: Antibiotics were prescribed at discharge for appropriate treatment of strep pharyngitis .    The patient will return for new or worsening symptoms and is stable at the time of  discharge.    DISPOSITION:  Patient will be discharged home in stable condition.    FOLLOW UP:  Tg Ragland D.N.P.  [address] [facility] [id number] [phone]      As needed    OUTPATIENT MEDICATIONS:  Discharge Medication List as of 6/19/2023  3:29 PM        START taking these medications    Details   amoxicillin (AMOXIL) 875 MG tablet Take 1 Tablet by mouth 2 times a day for 7 days., Disp-14 Tablet, R-0, Normal      valacyclovir (VALTREX) 1 GM Tab Take 1 Tablet by mouth 2 times a day., Disp-20 Tablet, R-0, Normal            FINAL DIAGNOSIS  1. Strep pharyngitis    2. Cold sore       IAnna (Scribe), am scribing for, and in the presence of, Simone Smith M.D..    Electronically signed by: Anna Hamilton (Scribe), 6/19/2023    ISimone M.D. personally performed the services described in this documentation, as scribed by Anna Hamilton in my presence, and it is both accurate and complete.      The note accurately reflects work and decisions made by me.  Simone Smith M.D.  6/19/2023  4:31 PM

## 2023-06-19 NOTE — DISCHARGE INSTRUCTIONS
You have strep throat.  Get your prescription and take all pills as directed, even if you feel better first.  You will have some pain and fevers for another day or 2, possibly.  Take ibuprofen or Tylenol as needed.

## 2023-08-18 ENCOUNTER — OFFICE VISIT (OUTPATIENT)
Dept: MEDICAL GROUP | Facility: PHYSICIAN GROUP | Age: 23
End: 2023-08-18
Payer: COMMERCIAL

## 2023-08-18 ENCOUNTER — HOSPITAL ENCOUNTER (OUTPATIENT)
Dept: LAB | Facility: MEDICAL CENTER | Age: 23
End: 2023-08-18
Attending: OBSTETRICS & GYNECOLOGY
Payer: COMMERCIAL

## 2023-08-18 VITALS
HEART RATE: 94 BPM | BODY MASS INDEX: 38.99 KG/M2 | DIASTOLIC BLOOD PRESSURE: 84 MMHG | WEIGHT: 234 LBS | OXYGEN SATURATION: 99 % | TEMPERATURE: 97.7 F | RESPIRATION RATE: 16 BRPM | HEIGHT: 65 IN | SYSTOLIC BLOOD PRESSURE: 122 MMHG

## 2023-08-18 DIAGNOSIS — E28.2 PCOS (POLYCYSTIC OVARIAN SYNDROME): ICD-10-CM

## 2023-08-18 DIAGNOSIS — E66.9 OBESITY (BMI 30-39.9): ICD-10-CM

## 2023-08-18 LAB
B-HCG SERPL-ACNC: <1 MIU/ML (ref 0–5)
DHEA-S SERPL-MCNC: 204 UG/DL (ref 98.8–340)
ESTRADIOL SERPL-MCNC: 38.9 PG/ML
FSH SERPL-ACNC: 5.7 MIU/ML
LH SERPL-ACNC: 6 IU/L
PROLACTIN SERPL-MCNC: 33.1 NG/ML (ref 2.8–26)
TSH SERPL DL<=0.005 MIU/L-ACNC: 1.67 UIU/ML (ref 0.38–5.33)

## 2023-08-18 PROCEDURE — 3074F SYST BP LT 130 MM HG: CPT | Performed by: NURSE PRACTITIONER

## 2023-08-18 PROCEDURE — 83002 ASSAY OF GONADOTROPIN (LH): CPT

## 2023-08-18 PROCEDURE — 83001 ASSAY OF GONADOTROPIN (FSH): CPT

## 2023-08-18 PROCEDURE — 84443 ASSAY THYROID STIM HORMONE: CPT

## 2023-08-18 PROCEDURE — 82670 ASSAY OF TOTAL ESTRADIOL: CPT

## 2023-08-18 PROCEDURE — 84702 CHORIONIC GONADOTROPIN TEST: CPT

## 2023-08-18 PROCEDURE — 84403 ASSAY OF TOTAL TESTOSTERONE: CPT

## 2023-08-18 PROCEDURE — 99214 OFFICE O/P EST MOD 30 MIN: CPT | Performed by: NURSE PRACTITIONER

## 2023-08-18 PROCEDURE — 84146 ASSAY OF PROLACTIN: CPT

## 2023-08-18 PROCEDURE — 84402 ASSAY OF FREE TESTOSTERONE: CPT

## 2023-08-18 PROCEDURE — 82627 DEHYDROEPIANDROSTERONE: CPT

## 2023-08-18 PROCEDURE — 3079F DIAST BP 80-89 MM HG: CPT | Performed by: NURSE PRACTITIONER

## 2023-08-18 PROCEDURE — 84270 ASSAY OF SEX HORMONE GLOBUL: CPT

## 2023-08-18 ASSESSMENT — FIBROSIS 4 INDEX: FIB4 SCORE: 0.28

## 2023-08-18 NOTE — PROGRESS NOTES
Subjective       CC:   Chief Complaint   Patient presents with    Weight Loss     Went to GYN this morning, follow up with Tg, no medication, would like to start, concerns about PCOS    Lab Results     6/19/23    Dysmenorrhea     Concerns about PCOS, miscarriage issues, irregular peroids, every three months, facial hair, fatigue, lower pelvic pain on L side, GYN ordered US        HPI:   Patient is a 23 y.o. established female patient with medical history listed below here today for evaluation and management of weight loss, dysmenorrhea; she recently met with GYN, concern for PCOS; she has labs & US ordered by GYN and then is to follow up with GYN. Mom accompanies her to visit today.     Patient Active Problem List   Diagnosis    Obesity (BMI 30-39.9)    Encounter to establish care    Major depressive disorder    COVID-19    PCOS (polycystic ovarian syndrome)       Past Medical History:   Diagnosis Date    Pregnant         Past Surgical History:   Procedure Laterality Date    DILATION AND EVACUATION N/A 9/19/2022    Procedure: SUCTION DILATION AND EVACUATION;  Surgeon: Oz Encarnacion M.D.;  Location: SURGERY SAME DAY [facility];  Service: Gynecology    APPENDECTOMY  2007        Current Outpatient Medications on File Prior to Visit   Medication Sig Dispense Refill    valacyclovir (VALTREX) 1 GM Tab Take 1 Tablet by mouth 2 times a day. 20 Tablet 0     No current facility-administered medications on file prior to visit.        ROS:  Review of Systems   Constitutional: Negative.  Negative for fever and malaise/fatigue.   Respiratory:  Negative for cough, sputum production and shortness of breath.    Cardiovascular:  Negative for chest pain, palpitations and leg swelling.   Gastrointestinal: Negative.    Genitourinary: Negative.    Musculoskeletal: Negative.    Neurological: Negative.    Endo/Heme/Allergies: Negative.    Psychiatric/Behavioral: Negative.         Objective       Exam:  /84   Pulse 94   Temp  "36.5 °C (97.7 °F) (Temporal)   Resp 16   Ht 1.651 m (5' 5\")   Wt 106 kg (234 lb)   SpO2 99%   BMI 38.94 kg/m²  Body mass index is 38.94 kg/m².    Physical Exam  Constitutional:       Appearance: Normal appearance. She is obese.   Cardiovascular:      Rate and Rhythm: Normal rate and regular rhythm.      Pulses: Normal pulses.      Heart sounds: Normal heart sounds.   Pulmonary:      Effort: Pulmonary effort is normal.      Breath sounds: Normal breath sounds.   Musculoskeletal:         General: Normal range of motion.      Right lower leg: No edema.      Left lower leg: No edema.   Skin:     General: Skin is warm and dry.   Neurological:      Mental Status: She is alert.         Labs: reviewed with patient; questions answered    Assessment & Plan       23 y.o. female with the following -     Problem List Items Addressed This Visit       Obesity (BMI 30-39.9)     Chronic struggle with weight gain. difficulty losing weight since her miscarriage & D&C in 10/2022; had gained weight about 15lbs when she got pregnant; after D&C noticed she was not losing the added weight. She is working with a  at the gym 3x/week and has been on a low calorie meal plan the past several months. 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 She is also noticing increased facial hair and her period has been irregular since her miscarriage. Her GYN is concerned for PCOS component, she has pending US & follow up lab work. We are trying metformin first as her mom had success with this in the past with weight management. We will consider GLP-1 agent for weight loss if not tolerating metformin.          Relevant Medications    metFORMIN (GLUCOPHAGE) 500 MG Tab    PCOS (polycystic ovarian syndrome)     She has noticed weight gain since her miscarriage in 2022 despite low calorie diet & exercise; she has painful cramps during her cycle which is usually heavy and irregular. She is also " having increased facial hair. She recently met with GYN & there is concern for possible PCOS. She has US & some follow up labs ordered. A1C 5.0 in 1/2023; Body mass index is 38.94 kg/m². Mom was on metformin in the past for PCOS and had weight loss with this. She is open to trying this medication to see if it will help her energy and weight loss.   - start tapered dose metformin 500mg QD x 1-2 weeks and then increase to BID  - assess tolerance in 1 month; we can consider GLP-1 if she is not tolerating metformin         Relevant Medications    metFORMIN (GLUCOPHAGE) 500 MG Tab       Medications Prescribed Today:  1. PCOS (polycystic ovarian syndrome)  - metFORMIN (GLUCOPHAGE) 500 MG Tab; Take 1 Tablet by mouth 2 times a day with meals.  Dispense: 60 Tablet; Refill: 1    Educated in proper administration of medication(s) ordered today including safety, possible SE, risks, benefits, rationale and alternatives to therapy.     Return in about 4 weeks (around 9/15/2023) for PCOS, metformin .    Please note that this dictation was created using voice recognition software. I have made every reasonable attempt to correct obvious errors, but I expect that there are errors of grammar and possibly content that I did not discover before finalizing the note.

## 2023-08-19 PROBLEM — E28.2 PCOS (POLYCYSTIC OVARIAN SYNDROME): Status: ACTIVE | Noted: 2023-08-19

## 2023-08-19 ASSESSMENT — ENCOUNTER SYMPTOMS
PALPITATIONS: 0
NEUROLOGICAL NEGATIVE: 1
FEVER: 0
CONSTITUTIONAL NEGATIVE: 1
MUSCULOSKELETAL NEGATIVE: 1
SPUTUM PRODUCTION: 0
PSYCHIATRIC NEGATIVE: 1
COUGH: 0
GASTROINTESTINAL NEGATIVE: 1
SHORTNESS OF BREATH: 0

## 2023-08-20 NOTE — ASSESSMENT & PLAN NOTE
She has noticed weight gain since her miscarriage in 2022 despite low calorie diet & exercise; she has painful cramps during her cycle which is usually heavy and irregular. She is also having increased facial hair. She recently met with GYN & there is concern for possible PCOS. She has US & some follow up labs ordered. A1C 5.0 in 1/2023; Body mass index is 38.94 kg/m². Mom was on metformin in the past for PCOS and had weight loss with this. She is open to trying this medication to see if it will help her energy and weight loss.   - start tapered dose metformin 500mg QD x 1-2 weeks and then increase to BID  - assess tolerance in 1 month; we can consider GLP-1 if she is not tolerating metformin

## 2023-08-20 NOTE — ASSESSMENT & PLAN NOTE
Chronic struggle with weight gain. difficulty losing weight since her miscarriage & D&C in 10/2022; had gained weight about 15lbs when she got pregnant; after D&C noticed she was not losing the added weight. She is working with a  at the gym 3x/week and has been on a low calorie meal plan the past several months. 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 She is also noticing increased facial hair and her period has been irregular since her miscarriage. Her GYN is concerned for PCOS component, she has pending US & follow up lab work. We are trying metformin first as her mom had success with this in the past with weight management. We will consider GLP-1 agent for weight loss if not tolerating metformin.

## 2023-08-24 ENCOUNTER — HOSPITAL ENCOUNTER (OUTPATIENT)
Dept: LAB | Facility: MEDICAL CENTER | Age: 23
End: 2023-08-24
Attending: OBSTETRICS & GYNECOLOGY
Payer: COMMERCIAL

## 2023-08-24 LAB
PROLACTIN SERPL-MCNC: 14.9 NG/ML (ref 2.8–26)
SHBG SERPL-SCNC: 40 NMOL/L (ref 25–122)
TESTOST FREE SERPL-MCNC: 4.9 PG/ML (ref 0.8–7.4)
TESTOST SERPL-MCNC: 33 NG/DL (ref 9–55)

## 2023-08-24 PROCEDURE — 84146 ASSAY OF PROLACTIN: CPT

## 2023-09-14 RX ORDER — METRONIDAZOLE 500 MG/1
TABLET ORAL
COMMUNITY
Start: 2023-08-23 | End: 2023-09-14

## 2023-09-14 RX ORDER — FLUCONAZOLE 150 MG/1
TABLET ORAL
COMMUNITY
Start: 2023-08-23 | End: 2023-09-15

## 2023-09-15 ENCOUNTER — OFFICE VISIT (OUTPATIENT)
Dept: MEDICAL GROUP | Facility: PHYSICIAN GROUP | Age: 23
End: 2023-09-15
Payer: COMMERCIAL

## 2023-09-15 VITALS
HEART RATE: 72 BPM | BODY MASS INDEX: 38.82 KG/M2 | HEIGHT: 65 IN | RESPIRATION RATE: 16 BRPM | TEMPERATURE: 98.1 F | DIASTOLIC BLOOD PRESSURE: 62 MMHG | SYSTOLIC BLOOD PRESSURE: 120 MMHG | WEIGHT: 233 LBS | OXYGEN SATURATION: 100 %

## 2023-09-15 DIAGNOSIS — E28.2 PCOS (POLYCYSTIC OVARIAN SYNDROME): ICD-10-CM

## 2023-09-15 DIAGNOSIS — E66.9 OBESITY (BMI 30-39.9): ICD-10-CM

## 2023-09-15 PROCEDURE — 99214 OFFICE O/P EST MOD 30 MIN: CPT | Performed by: NURSE PRACTITIONER

## 2023-09-15 PROCEDURE — 3074F SYST BP LT 130 MM HG: CPT | Performed by: NURSE PRACTITIONER

## 2023-09-15 PROCEDURE — 3078F DIAST BP <80 MM HG: CPT | Performed by: NURSE PRACTITIONER

## 2023-09-15 RX ORDER — DROSPIRENONE AND ETHINYL ESTRADIOL 0.02-3(28)
KIT ORAL
COMMUNITY
Start: 2023-09-08

## 2023-09-15 ASSESSMENT — ENCOUNTER SYMPTOMS
FEVER: 0
GASTROINTESTINAL NEGATIVE: 1
SHORTNESS OF BREATH: 0
MUSCULOSKELETAL NEGATIVE: 1
PSYCHIATRIC NEGATIVE: 1
PALPITATIONS: 0
NEUROLOGICAL NEGATIVE: 1
COUGH: 0
SPUTUM PRODUCTION: 0
CONSTITUTIONAL NEGATIVE: 1

## 2023-09-15 ASSESSMENT — FIBROSIS 4 INDEX: FIB4 SCORE: 0.28

## 2023-09-15 NOTE — ASSESSMENT & PLAN NOTE
She has noticed weight gain since her miscarriage in 2022 despite low calorie diet & exercise; she has painful cramps during her cycle which is usually heavy and irregular. She is also having increased facial hair. She recently met with GYN a few months ago & there was concern for possible PCOS; confirmed with US. She is on daily JERAMY 3-0.02 to regulate her cycle.   A1C 5.0 in 1/2023; Body mass index is 38.77 kg/m². Mom was on metformin in the past for PCOS and had weight loss with this. We started her on Metformin in 8/19/2023  to see if it will help her energy and weight loss, currently at 500mg BID; she is tolerating medication without side effects; she has not noticed much difference in weight yet. we will increase dose today. She plans to add vitD/b12 supplements as well.  - increase Metformin to 1000mg BID (from 500mg)   - assess tolerance in 3 months; we can consider GLP-1 if she is not tolerating metformin or not meeting weight loss goals

## 2023-09-15 NOTE — PROGRESS NOTES
"Subjective       CC:   Chief Complaint   Patient presents with    Diabetes Follow-up     Metformin follow up, doesn't notice much difference on it,        HPI:   Patient is a 23 y.o. established female patient with medical history listed below here today for follow up on PCOS, weight gain; we started her on metformin at our last visit. Tolerating okay, not much difference in weight. We will increase dose today.     Problem   Pcos (Polycystic Ovarian Syndrome)       Patient Active Problem List   Diagnosis    Obesity (BMI 30-39.9)    Encounter to establish care    Major depressive disorder    COVID-19    PCOS (polycystic ovarian syndrome)       Past Medical History:   Diagnosis Date    Pregnant         Past Surgical History:   Procedure Laterality Date    DILATION AND EVACUATION N/A 9/19/2022    Procedure: SUCTION DILATION AND EVACUATION;  Surgeon: Oz Encarnacion M.D.;  Location: SURGERY SAME DAY [facility];  Service: Gynecology    APPENDECTOMY  2007        Current Outpatient Medications on File Prior to Visit   Medication Sig Dispense Refill    drospirenone-ethinyl estradiol (JERAMY) 3-0.02 MG per tablet       valacyclovir (VALTREX) 1 GM Tab Take 1 Tablet by mouth 2 times a day. 20 Tablet 0     No current facility-administered medications on file prior to visit.      ROS:  Review of Systems   Constitutional: Negative.  Negative for fever and malaise/fatigue.   Respiratory:  Negative for cough, sputum production and shortness of breath.    Cardiovascular:  Negative for chest pain, palpitations and leg swelling.   Gastrointestinal: Negative.    Genitourinary: Negative.    Musculoskeletal: Negative.    Neurological: Negative.    Endo/Heme/Allergies: Negative.    Psychiatric/Behavioral: Negative.         Objective       Exam:  /62   Pulse 72   Temp 36.7 °C (98.1 °F) (Temporal)   Resp 16   Ht 1.651 m (5' 5\")   Wt 106 kg (233 lb)   SpO2 100%   BMI 38.77 kg/m²  Body mass index is 38.77 kg/m².    Physical " Exam  Constitutional:       Appearance: Normal appearance. She is obese.   Neurological:      Mental Status: She is alert.   Psychiatric:         Mood and Affect: Mood normal.         Behavior: Behavior normal.         Thought Content: Thought content normal.         Judgment: Judgment normal.         Assessment & Plan       23 y.o. female with the following -     Problem List Items Addressed This Visit       Obesity (BMI 30-39.9)    Relevant Medications    metformin (GLUCOPHAGE) 1000 MG tablet    Other Relevant Orders    Patient identified as having weight management issue.  Appropriate orders and counseling given.    PCOS (polycystic ovarian syndrome)     She has noticed weight gain since her miscarriage in 2022 despite low calorie diet & exercise; she has painful cramps during her cycle which is usually heavy and irregular. She is also having increased facial hair. She recently met with GYN a few months ago & there was concern for possible PCOS; confirmed with US. She is on daily JERAMY 3-0.02 to regulate her cycle.   A1C 5.0 in 1/2023; Body mass index is 38.77 kg/m². Mom was on metformin in the past for PCOS and had weight loss with this. We started her on Metformin in 8/19/2023  to see if it will help her energy and weight loss, currently at 500mg BID; she is tolerating medication without side effects; she has not noticed much difference in weight yet. we will increase dose today. She plans to add vitD/b12 supplements as well.  - increase Metformin to 1000mg BID (from 500mg)   - assess tolerance in 3 months; we can consider GLP-1 if she is not tolerating metformin or not meeting weight loss goals         Relevant Medications    metformin (GLUCOPHAGE) 1000 MG tablet       Medications Prescribed Today:  1. PCOS (polycystic ovarian syndrome)  - metformin (GLUCOPHAGE) 1000 MG tablet; Take 1 Tablet by mouth 2 times a day with meals.  Dispense: 180 Tablet; Refill: 1    Educated in proper administration of medication(s)  ordered today including safety, possible SE, risks, benefits, rationale and alternatives to therapy.     Return in about 3 months (around 12/15/2023) for PCOS, weight loss.    Please note that this dictation was created using voice recognition software. I have made every reasonable attempt to correct obvious errors, but I expect that there are errors of grammar and possibly content that I did not discover before finalizing the note.

## 2024-01-03 ENCOUNTER — HOSPITAL ENCOUNTER (EMERGENCY)
Facility: MEDICAL CENTER | Age: 24
End: 2024-01-03
Attending: EMERGENCY MEDICINE
Payer: COMMERCIAL

## 2024-01-03 ENCOUNTER — APPOINTMENT (OUTPATIENT)
Dept: RADIOLOGY | Facility: MEDICAL CENTER | Age: 24
End: 2024-01-03
Attending: EMERGENCY MEDICINE
Payer: COMMERCIAL

## 2024-01-03 VITALS
HEART RATE: 56 BPM | BODY MASS INDEX: 39.56 KG/M2 | WEIGHT: 231.7 LBS | TEMPERATURE: 97.4 F | DIASTOLIC BLOOD PRESSURE: 74 MMHG | HEIGHT: 64 IN | SYSTOLIC BLOOD PRESSURE: 131 MMHG | OXYGEN SATURATION: 100 % | RESPIRATION RATE: 16 BRPM

## 2024-01-03 DIAGNOSIS — K80.80 BILIARY CALCULUS OF OTHER SITE WITHOUT OBSTRUCTION: ICD-10-CM

## 2024-01-03 DIAGNOSIS — R11.0 NAUSEA: ICD-10-CM

## 2024-01-03 LAB
ALBUMIN SERPL BCP-MCNC: 4.4 G/DL (ref 3.2–4.9)
ALBUMIN/GLOB SERPL: 1.6 G/DL
ALP SERPL-CCNC: 68 U/L (ref 30–99)
ALT SERPL-CCNC: 93 U/L (ref 2–50)
AMORPH CRY #/AREA URNS HPF: PRESENT /HPF
ANION GAP SERPL CALC-SCNC: 11 MMOL/L (ref 7–16)
APPEARANCE UR: ABNORMAL
AST SERPL-CCNC: 63 U/L (ref 12–45)
BACTERIA #/AREA URNS HPF: NEGATIVE /HPF
BASOPHILS # BLD AUTO: 0.8 % (ref 0–1.8)
BASOPHILS # BLD: 0.06 K/UL (ref 0–0.12)
BILIRUB SERPL-MCNC: 0.3 MG/DL (ref 0.1–1.5)
BILIRUB UR QL STRIP.AUTO: NEGATIVE
BUN SERPL-MCNC: 9 MG/DL (ref 8–22)
CALCIUM ALBUM COR SERPL-MCNC: 8.7 MG/DL (ref 8.5–10.5)
CALCIUM SERPL-MCNC: 9 MG/DL (ref 8.5–10.5)
CHLORIDE SERPL-SCNC: 105 MMOL/L (ref 96–112)
CO2 SERPL-SCNC: 23 MMOL/L (ref 20–33)
COLOR UR: YELLOW
CREAT SERPL-MCNC: 0.54 MG/DL (ref 0.5–1.4)
EOSINOPHIL # BLD AUTO: 0.57 K/UL (ref 0–0.51)
EOSINOPHIL NFR BLD: 7.6 % (ref 0–6.9)
EPI CELLS #/AREA URNS HPF: ABNORMAL /HPF
ERYTHROCYTE [DISTWIDTH] IN BLOOD BY AUTOMATED COUNT: 43.7 FL (ref 35.9–50)
GFR SERPLBLD CREATININE-BSD FMLA CKD-EPI: 131 ML/MIN/1.73 M 2
GLOBULIN SER CALC-MCNC: 2.7 G/DL (ref 1.9–3.5)
GLUCOSE SERPL-MCNC: 113 MG/DL (ref 65–99)
GLUCOSE UR STRIP.AUTO-MCNC: NEGATIVE MG/DL
HCG SERPL QL: NEGATIVE
HCT VFR BLD AUTO: 41.6 % (ref 37–47)
HGB BLD-MCNC: 14.1 G/DL (ref 12–16)
HYALINE CASTS #/AREA URNS LPF: ABNORMAL /LPF
IMM GRANULOCYTES # BLD AUTO: 0.02 K/UL (ref 0–0.11)
IMM GRANULOCYTES NFR BLD AUTO: 0.3 % (ref 0–0.9)
KETONES UR STRIP.AUTO-MCNC: NEGATIVE MG/DL
LEUKOCYTE ESTERASE UR QL STRIP.AUTO: ABNORMAL
LIPASE SERPL-CCNC: 28 U/L (ref 11–82)
LYMPHOCYTES # BLD AUTO: 1.62 K/UL (ref 1–4.8)
LYMPHOCYTES NFR BLD: 21.6 % (ref 22–41)
MCH RBC QN AUTO: 30.7 PG (ref 27–33)
MCHC RBC AUTO-ENTMCNC: 33.9 G/DL (ref 32.2–35.5)
MCV RBC AUTO: 90.4 FL (ref 81.4–97.8)
MICRO URNS: ABNORMAL
MONOCYTES # BLD AUTO: 0.51 K/UL (ref 0–0.85)
MONOCYTES NFR BLD AUTO: 6.8 % (ref 0–13.4)
NEUTROPHILS # BLD AUTO: 4.72 K/UL (ref 1.82–7.42)
NEUTROPHILS NFR BLD: 62.9 % (ref 44–72)
NITRITE UR QL STRIP.AUTO: NEGATIVE
NRBC # BLD AUTO: 0 K/UL
NRBC BLD-RTO: 0 /100 WBC (ref 0–0.2)
PH UR STRIP.AUTO: 7.5 [PH] (ref 5–8)
PLATELET # BLD AUTO: 283 K/UL (ref 164–446)
PMV BLD AUTO: 10.4 FL (ref 9–12.9)
POTASSIUM SERPL-SCNC: 4 MMOL/L (ref 3.6–5.5)
PROT SERPL-MCNC: 7.1 G/DL (ref 6–8.2)
PROT UR QL STRIP: NEGATIVE MG/DL
RBC # BLD AUTO: 4.6 M/UL (ref 4.2–5.4)
RBC # URNS HPF: ABNORMAL /HPF
RBC UR QL AUTO: NEGATIVE
SODIUM SERPL-SCNC: 139 MMOL/L (ref 135–145)
SP GR UR STRIP.AUTO: 1.02
UROBILINOGEN UR STRIP.AUTO-MCNC: 0.2 MG/DL
WBC # BLD AUTO: 7.5 K/UL (ref 4.8–10.8)
WBC #/AREA URNS HPF: ABNORMAL /HPF

## 2024-01-03 PROCEDURE — 80053 COMPREHEN METABOLIC PANEL: CPT

## 2024-01-03 PROCEDURE — 700111 HCHG RX REV CODE 636 W/ 250 OVERRIDE (IP): Performed by: EMERGENCY MEDICINE

## 2024-01-03 PROCEDURE — 83690 ASSAY OF LIPASE: CPT

## 2024-01-03 PROCEDURE — 96375 TX/PRO/DX INJ NEW DRUG ADDON: CPT

## 2024-01-03 PROCEDURE — 99284 EMERGENCY DEPT VISIT MOD MDM: CPT

## 2024-01-03 PROCEDURE — 85025 COMPLETE CBC W/AUTO DIFF WBC: CPT

## 2024-01-03 PROCEDURE — 84703 CHORIONIC GONADOTROPIN ASSAY: CPT

## 2024-01-03 PROCEDURE — 36415 COLL VENOUS BLD VENIPUNCTURE: CPT

## 2024-01-03 PROCEDURE — 76705 ECHO EXAM OF ABDOMEN: CPT

## 2024-01-03 PROCEDURE — 81001 URINALYSIS AUTO W/SCOPE: CPT

## 2024-01-03 PROCEDURE — 96374 THER/PROPH/DIAG INJ IV PUSH: CPT

## 2024-01-03 RX ORDER — ONDANSETRON 4 MG/1
4 TABLET, ORALLY DISINTEGRATING ORAL EVERY 6 HOURS PRN
Qty: 20 TABLET | Refills: 0 | Status: SHIPPED | OUTPATIENT
Start: 2024-01-03

## 2024-01-03 RX ORDER — KETOROLAC TROMETHAMINE 30 MG/ML
15 INJECTION, SOLUTION INTRAMUSCULAR; INTRAVENOUS ONCE
Status: COMPLETED | OUTPATIENT
Start: 2024-01-03 | End: 2024-01-03

## 2024-01-03 RX ORDER — ONDANSETRON 2 MG/ML
4 INJECTION INTRAMUSCULAR; INTRAVENOUS ONCE
Status: COMPLETED | OUTPATIENT
Start: 2024-01-03 | End: 2024-01-03

## 2024-01-03 RX ADMIN — KETOROLAC TROMETHAMINE 15 MG: 30 INJECTION, SOLUTION INTRAMUSCULAR; INTRAVENOUS at 06:46

## 2024-01-03 RX ADMIN — ONDANSETRON 4 MG: 2 INJECTION INTRAMUSCULAR; INTRAVENOUS at 06:50

## 2024-01-03 ASSESSMENT — PAIN DESCRIPTION - PAIN TYPE
TYPE: ACUTE PAIN
TYPE: ACUTE PAIN

## 2024-01-03 ASSESSMENT — FIBROSIS 4 INDEX: FIB4 SCORE: 0.28

## 2024-01-03 NOTE — ED TRIAGE NOTES
Chief Complaint   Patient presents with    Abdominal Pain     RUQ x 1 day, pt states it worsens after eating    N/V     Pt ambulatory to triage for above complaints, A+O x 4, GCS 15, NAD, answering all questions appropriately    Abdominal protocol ordered

## 2024-01-03 NOTE — ED PROVIDER NOTES
ED Provider Note    Scribed for Lorri Savage M.D. by Malika Cano. 1/3/2024, 5:51 AM.    Primary care provider: Tg Ragland D.N.P.  Means of arrival: Walk-In  History obtained from: Patient  History limited by: None    CHIEF COMPLAINT  Chief Complaint   Patient presents with    Abdominal Pain     RUQ x 1 day, pt states it worsens after eating    N/V       HPI/ROS  Amarilis Gillespie is a 23 y.o. female who presents to the Emergency Department for evaluation of acute right upper quadrant pain onset at 2 AM this morning. She notes that the pain worsens with eating.  She feels as if she may have had subjective fevers.  Currently in the ED, patient has a temperature of 96.8 °F. She also has associated nausea, vomiting.  She denies any dysuria or changes in bowel movements.  She reports that she has had episodes of similar pain in the past that have been intermittent for the last couple of months but this is the most severe episode she has had.  No alleviating or exacerbating factors reported. History of appendectomy. No known drug allergies.     EXTERNAL RECORDS REVIEWED  Patient was seen in the ED on 4/5/23 for nausea, vomiting, and diarrhea. She was diagnosed with gastroenteritis at that time. She had a miscarriage in September 2022.     LIMITATION TO HISTORY   Select: : None    OUTSIDE HISTORIAN(S):  Significant other : Boyfriend, who is at bedside      PAST MEDICAL HISTORY   has a past medical history of Pregnant.    SURGICAL HISTORY   has a past surgical history that includes appendectomy (2007) and dilation and evacuation (N/A, 9/19/2022).    SOCIAL HISTORY  Social History     Tobacco Use    Smoking status: Never    Smokeless tobacco: Never   Vaping Use    Vaping Use: Never used   Substance Use Topics    Alcohol use: No    Drug use: No      Social History     Substance and Sexual Activity   Drug Use No       FAMILY HISTORY  Family History   Problem Relation Age of Onset    No Known Problems Mother  "    No Known Problems Father     Hypertension Maternal Grandmother     Cancer Maternal Grandfather         thyroid    Hypertension Maternal Grandfather        CURRENT MEDICATIONS  Home Medications       Reviewed by Odalis Al R.N. (Registered Nurse) on 01/03/24 at 0328  Med List Status: Partial     Medication Last Dose Status   drospirenone-ethinyl estradiol (JERAMY) 3-0.02 MG per tablet  Active   metformin (GLUCOPHAGE) 1000 MG tablet  Active   valacyclovir (VALTREX) 1 GM Tab  Active                    ALLERGIES  No Known Allergies    PHYSICAL EXAM  VITAL SIGNS: BP (!) 143/87   Pulse 85   Temp 36 °C (96.8 °F) (Temporal)   Resp 17   Ht 1.626 m (5' 4\")   Wt 105 kg (231 lb 11.3 oz)   LMP 12/28/2023 (Approximate)   SpO2 99%   BMI 39.77 kg/m²   Vitals reviewed by myself.  Nursing note and vitals reviewed.  Constitutional: Well-developed and well-nourished.  Appears slightly uncomfortable  HENT: Head is normocephalic and atraumatic.  Eyes: extra-ocular movements intact  Cardiovascular: Regular rate and regular rhythm. No murmur heard.  Pulmonary/Chest: Breath sounds normal. No wheezes or rales.   Abdominal: Soft. Right upper quadrant and epigastrium tenderness. No distention.    Musculoskeletal: Extremities exhibit normal range of motion without edema or tenderness.   Neurological: Awake and alert  Skin: Skin is warm and dry. No rash.      DIAGNOSTIC STUDIES:  LABS  Labs Reviewed   CBC WITH DIFFERENTIAL - Abnormal; Notable for the following components:       Result Value    Lymphocytes 21.60 (*)     Eosinophils 7.60 (*)     Eos (Absolute) 0.57 (*)     All other components within normal limits   COMP METABOLIC PANEL - Abnormal; Notable for the following components:    Glucose 113 (*)     AST(SGOT) 63 (*)     ALT(SGPT) 93 (*)     All other components within normal limits   URINALYSIS,CULTURE IF INDICATED - Abnormal; Notable for the following components:    Character Cloudy (*)     Leukocyte Esterase Large (*)     " All other components within normal limits    Narrative:     Indication for culture:->Patient WITHOUT an indwelling Cooper  catheter in place with new onset of Dysuria, Frequency,  Urgency, and/or Suprapubic pain   URINE MICROSCOPIC (W/UA) - Abnormal; Notable for the following components:    WBC 20-50 (*)     RBC 2-5 (*)     Epithelial Cells Moderate (*)     All other components within normal limits    Narrative:     Indication for culture:->Patient WITHOUT an indwelling Cooper  catheter in place with new onset of Dysuria, Frequency,  Urgency, and/or Suprapubic pain   LIPASE   HCG QUAL SERUM   ESTIMATED GFR     All labs reviewed and independently interpreted by myself      RADIOLOGY  Final interpretation by radiology demonstrates:    US-RUQ   Final Result      1.  Gallbladder filled with stones. No definite sonographic evidence for acute cholecystitis.   2.  Hepatic steatosis.        The radiologist's interpretation of all radiological studies have been reviewed by me.      COURSE & MEDICAL DECISION MAKING    ED Observation Status? No; Patient does not meet criteria for ED Observation.     INITIAL ASSESSMENT AND PLAN    Patient is a 23-year-old female who presents for evaluation of right upper quadrant abdominal pain.  Differential diagnosis includes gastritis, cholelithiasis, cholecystitis, choledocholithiasis.  Diagnostic workup includes labs and right upper quadrant ultrasound.       REASSESSMENTS   5:51 AM - Patient was seen and evaluated at bedside. Patient presents to the ED for right upper quadrant abdominal pain. After my exam, I discussed with the patient the plan of care, which includes treating the patient with medication for their symptoms, as well as obtaining lab work and imaging for further evaluation. Patient understands and verbalizes agreement to plan of care.     7:29 AM - Patient was reevaluated at bedside. Discussed lab and radiology results with the patient. I explained to them that I will sending  her home with a prescription for Zofran, and that a referral to General Surgery was given.They had the opportunity to ask questions. No further questions or concerns at this time. I then informed the patient of my plan for discharge, which includes strict return precautions for any new or worsening symptoms. Patient understands and verbalizes agreement to plan of care. Patient is comfortable going home at this time.       ER COURSE AND FINAL DISPOSITION   Patient's initial vitals are notable for slight hypertension likely secondary to pain.  She is treated with Toradol and Zofran after which she feels improved.  Labs returned are unremarkable except for mildly elevated AST and ALT.  Bilirubin is within normal limits.  Ultrasound returns and is consistent with cholelithiasis, no evidence of cholecystitis.  No gallbladder duct dilatation.  Therefore as patient is feeling improved I advised her that this can be managed outpatient with follow-up with general surgery.  Referral to general surgery is placed by myself.  She is amenable to this plan.  She is given strict return precautions and discharged in stable condition.    ADDITIONAL PROBLEM LIST AND RESOURCE UTILIZATION    Additional problems aside from the chief complaint that I have addressed: none    I have discussed management of the patient with the following physicians and DAVID's: None    Discussion of management with other [facility] or appropriate source(s): None     Escalation of care considered, and ultimately not performed: see above.     Barriers to care at this time, including but not limited to:  None .     Decision tools and prescription drugs considered including, but not limited to:  Zofran to help treat her nausea and vomiting .    Please see review of records as noted above    Patient will be discharged home.    FOLLOW UP:  Cristina Ding M.D.  [address] [place] [id number] [phone]            OUTPATIENT MEDICATIONS:  New  Prescriptions    ONDANSETRON (ZOFRAN ODT) 4 MG TABLET DISPERSIBLE    Take 1 Tablet by mouth every 6 hours as needed for Nausea/Vomiting.        FINAL IMPRESSION  1. Biliary calculus of other site without obstruction    2. Nausea          Malika PRUETT (Scribe), am scribing for, and in the presence of, Lorri Savage M.D..    Electronically signed by: Malika Cano (Scribe), 1/3/2024    Lorri PRUETT M.D. personally performed the services described in this documentation, as scribed by Malika Cano in my presence, and it is both accurate and complete.    The note accurately reflects work and decisions made by me.  Lorri Savage M.D.  1/3/2024  1:45 PM

## 2024-01-03 NOTE — ED NOTES
Bedside report from ANGELICA Rivers    ABCs intact. A+Ox4. Skin PWD. Vitals on the monitor.    Pt does not require O2 or fall precautions.

## 2024-01-05 ENCOUNTER — APPOINTMENT (OUTPATIENT)
Dept: MEDICAL GROUP | Facility: PHYSICIAN GROUP | Age: 24
End: 2024-01-05
Payer: COMMERCIAL

## 2024-01-14 ENCOUNTER — TELEPHONE (OUTPATIENT)
Dept: MEDICAL GROUP | Facility: PHYSICIAN GROUP | Age: 24
End: 2024-01-14
Payer: COMMERCIAL

## 2024-01-14 NOTE — LETTER
1/14/2024            Amarilis Gillespie  [address]  [place] [address]              Dear Amarilis,    Your care is very important to us, and we have noticed that on 01/10/2024, you missed your appointment with Tg Ragland D.N.P. at [facility]       We’re committed to providing you with the best care possible. Your appointment time is reserved for you and your provider to discuss any current or new health concerns and, together, determine the best plan of care for you. Please call [phone] to reschedule at your earliest convenience.        In some cases, [facility] offers additional resources to make your healthcare more accessible, including transportation assistance, financial assistance and virtual visits. To learn more about these resources, please call [phone].       In order to keep you as informed as possible, below is a brief summary of our policy regarding missed appointments:        If a patient “No Shows”??three (3) or more appointments within a rolling 12-month           period, they may be dismissed from the practice for failure to follow clinician      recommendations.     If you have any concerns regarding the care you are receiving, please talk with your provider or call the office at [phone] and request to speak with the Practice . We’re committed to providing excellent care, and your feedback is invaluable.          Sincerely,     Tg Ragland D.N.P.

## 2024-02-05 ENCOUNTER — TELEPHONE (OUTPATIENT)
Dept: MEDICAL GROUP | Facility: PHYSICIAN GROUP | Age: 24
End: 2024-02-05
Payer: COMMERCIAL

## 2024-02-05 NOTE — LETTER
2/5/2024            Amarilis Gillespie  [address]  [place] [address]              Dear Amarilis,    Your care is very important to us, and we have noticed that on 01/10/2024 and 02/02/2024, you missed your appointments with Tg Ragland D.N.P. at [facility]       We’re committed to providing you with the best care possible. Your appointment time is reserved for you and your provider to discuss any current or new health concerns and, together, determine the best plan of care for you. Please call [phone] to reschedule at your earliest convenience.        In some cases, [facility] offers additional resources to make your healthcare more accessible, including transportation assistance, financial assistance and virtual visits. To learn more about these resources, please call [phone].       In order to keep you as informed as possible, below is a brief summary of our policy regarding missed appointments:        If a patient “No Shows”??three (3) or more appointments within a rolling 12-month           period, they may be dismissed from the practice for failure to follow clinician      recommendations.     If you have any concerns regarding the care you are receiving, please talk with your provider or call the office at [phone] and request to speak with the Practice . We’re committed to providing excellent care, and your feedback is invaluable.          Sincerely,     Tg Ragland D.N.P.

## 2024-02-05 NOTE — TELEPHONE ENCOUNTER
Phone Number Called: [phone] (home)       Call outcome: Left detailed message for patient. Informed to call back with any additional questions.    Message:  Left voicemail with pt informing her of her second no show. I explained its important to call and cancel the appointment rather than do a no show as the pt could be discharged from the providers care on the third no show.

## 2024-05-21 ENCOUNTER — DOCUMENTATION (OUTPATIENT)
Dept: HEALTH INFORMATION MANAGEMENT | Facility: OTHER | Age: 24
End: 2024-05-21

## 2024-10-30 ENCOUNTER — TELEPHONE (OUTPATIENT)
Dept: HEALTH INFORMATION MANAGEMENT | Facility: OTHER | Age: 24
End: 2024-10-30

## 2024-10-30 ENCOUNTER — PATIENT OUTREACH (OUTPATIENT)
Dept: HEALTH INFORMATION MANAGEMENT | Facility: OTHER | Age: 24
End: 2024-10-30

## 2024-12-23 ENCOUNTER — OFFICE VISIT (OUTPATIENT)
Dept: URGENT CARE | Facility: CLINIC | Age: 24
End: 2024-12-23
Payer: COMMERCIAL

## 2024-12-23 VITALS
TEMPERATURE: 97.2 F | RESPIRATION RATE: 12 BRPM | HEART RATE: 75 BPM | BODY MASS INDEX: 38.51 KG/M2 | WEIGHT: 231.1 LBS | OXYGEN SATURATION: 99 % | SYSTOLIC BLOOD PRESSURE: 122 MMHG | HEIGHT: 65 IN | DIASTOLIC BLOOD PRESSURE: 74 MMHG

## 2024-12-23 DIAGNOSIS — J02.9 SORE THROAT: ICD-10-CM

## 2024-12-23 DIAGNOSIS — J06.9 UPPER RESPIRATORY INFECTION, ACUTE: ICD-10-CM

## 2024-12-23 PROCEDURE — 87651 STREP A DNA AMP PROBE: CPT | Performed by: PHYSICIAN ASSISTANT

## 2024-12-23 PROCEDURE — 99213 OFFICE O/P EST LOW 20 MIN: CPT | Performed by: PHYSICIAN ASSISTANT

## 2024-12-23 PROCEDURE — 3078F DIAST BP <80 MM HG: CPT | Performed by: PHYSICIAN ASSISTANT

## 2024-12-23 PROCEDURE — 3074F SYST BP LT 130 MM HG: CPT | Performed by: PHYSICIAN ASSISTANT

## 2024-12-23 PROCEDURE — 0241U POCT CEPHEID COV-2, FLU A/B, RSV - PCR: CPT | Performed by: PHYSICIAN ASSISTANT

## 2024-12-23 RX ORDER — LIDOCAINE HYDROCHLORIDE 20 MG/ML
15 SOLUTION OROPHARYNGEAL PRN
Qty: 600 ML | Refills: 0 | Status: SHIPPED | OUTPATIENT
Start: 2024-12-23

## 2024-12-23 ASSESSMENT — ENCOUNTER SYMPTOMS
SPUTUM PRODUCTION: 0
VOMITING: 0
DIARRHEA: 0
HEADACHES: 0
CHILLS: 0
EYE REDNESS: 0
WHEEZING: 0
DIZZINESS: 0
EYE DISCHARGE: 0
SHORTNESS OF BREATH: 0
SORE THROAT: 1
MYALGIAS: 1
NECK PAIN: 0

## 2024-12-23 ASSESSMENT — FIBROSIS 4 INDEX: FIB4 SCORE: 0.6

## 2024-12-23 NOTE — LETTER
December 23, 2024         Patient: Amarilis Gillespie   YOB: 2000   Date of Visit: 12/23/2024           To Whom it May Concern:    Amarilis Gillespie was seen in my clinic on 12/23/2024. Please excuse this patient from work due to recent illness.     If you have any questions or concerns, please don't hesitate to call.        Sincerely,           Tres Perales P.A.-C.  Electronically Signed

## 2024-12-23 NOTE — PROGRESS NOTES
"Subjective     Amarilis Gillespie is a 24 y.o. female who presents with Nasal Congestion (X3 dys fever, left side of throat is irritated, and sore throat mainly of left side. Patient states went out of state and came back feeling sick.)            Patient is a 24-year-old female presents to urgent care with chills, sore throat mainly on the left side with associated congestion and bodyaches.  Patient reports that she recently returned from visiting her brother and started to feel symptoms when she returned home.    URI   This is a new problem. Episode onset: 3 days ago. Maximum temperature: Sub. Associated symptoms include congestion, ear pain and a sore throat. Pertinent negatives include no chest pain, diarrhea, dysuria, headaches, neck pain, rash, vomiting or wheezing.       Review of Systems   Constitutional:  Positive for malaise/fatigue. Negative for chills.   HENT:  Positive for congestion, ear pain and sore throat. Negative for ear discharge.    Eyes:  Negative for discharge and redness.   Respiratory:  Negative for sputum production, shortness of breath and wheezing.         Rare cough     Cardiovascular:  Negative for chest pain.   Gastrointestinal:  Negative for diarrhea and vomiting.   Genitourinary:  Negative for dysuria and urgency.   Musculoskeletal:  Positive for myalgias. Negative for neck pain.   Skin:  Negative for itching and rash.   Neurological:  Negative for dizziness and headaches.              Objective     /74   Pulse 75   Temp 36.2 °C (97.2 °F) (Temporal)   Resp 12   Ht 1.651 m (5' 5\")   Wt 105 kg (231 lb 1.6 oz)   SpO2 99%   BMI 38.46 kg/m²    PMH: Reviewed.   MEDS: Reviewed .   ALLERGIES: No Known Allergies  SURGHX:   Past Surgical History:   Procedure Laterality Date    DILATION AND EVACUATION N/A 9/19/2022    Procedure: SUCTION DILATION AND EVACUATION;  Surgeon: Oz Encarnacion M.D.;  Location: SURGERY SAME DAY [facility];  Service: Gynecology    APPENDECTOMY  2007 "     SOCHX:  reports that she has never smoked. She has never used smokeless tobacco. She reports that she does not drink alcohol and does not use drugs.  FH: Family history was reviewed, no pertinent findings to report    Physical Exam  Vitals reviewed.   Constitutional:       Appearance: Normal appearance. She is well-developed.   HENT:      Head: Normocephalic and atraumatic.      Ears:      Comments: Bilateral clear middle ear effusions.     Nose:      Comments: Rhinorrhea noted.     Mouth/Throat:      Comments: Posterior oropharynx is erythematous, positive postnasal drainage.  No evidence of exudate.  Eyes:      Conjunctiva/sclera: Conjunctivae normal.      Pupils: Pupils are equal, round, and reactive to light.   Cardiovascular:      Rate and Rhythm: Normal rate and regular rhythm.      Heart sounds: No murmur heard.  Pulmonary:      Effort: Pulmonary effort is normal. No respiratory distress.      Breath sounds: Normal breath sounds.   Musculoskeletal:         General: Normal range of motion.      Cervical back: Normal range of motion and neck supple.      Right lower leg: No edema.      Left lower leg: No edema.   Lymphadenopathy:      Cervical: No cervical adenopathy.   Skin:     General: Skin is warm.      Findings: No rash.   Neurological:      Mental Status: She is alert and oriented to person, place, and time.   Psychiatric:         Mood and Affect: Mood normal.         Behavior: Behavior normal.         Thought Content: Thought content normal.                             Assessment & Plan        Assessment & Plan  Upper respiratory infection, acute    Orders:    POCT GROUP A STREP, PCR    POCT CoV-2, Flu A/B, RSV by PCR    Sore throat    Orders:    lidocaine (XYLOCAINE) 2 % Solution; Take 15 mL by mouth as needed for Throat/Mouth Pain. Gargle and spit every 4 hours as needed for pain.                MDM/ Plan /Discussion:        No evidence of secondary bacterial infection on examination today.  Will  write for the above and encouraged utilization of Flonase.  Strep is negative in clinic.  COVID, flu, RSV all negative as well.  We will treat symptomatically at this time.  Differential diagnosis, natural history, supportive care, and indications for immediate follow-up discussed. Side effects of OTC or prescribed medications discussed.      Follow-up as needed if symptoms worsen or fail to improve to PCP, Urgent care or Emergency Room.     I have personally reviewed prior external notes and test results pertinent to today's visit.  I have independently reviewed and interpreted all diagnostics ordered during this urgent care acute visit.   Discussed management options (risks,benefits, and alternatives to treatment).    The patient and/or guardian demonstrated a good understanding and agreed with the treatment plan. And all questions were answered.  Please note that this dictation was created using voice recognition software. I have made a reasonable attempt to correct obvious errors, but I expect that there are errors of grammar and possibly content that I did not discover before finalizing the note.

## 2024-12-23 NOTE — LETTER
December 23, 2024    To Whom It May Concern:         This is confirmation that Amarilis Gillespie attended her scheduled appointment with Tres Perales P.A.-C. on 12/23/24. Please excuse this patient from work today and tomorrow.          If you have any questions please do not hesitate to call me at the phone number listed below.    Sincerely,          Tres Perales P.A.-C.  [phone]

## 2025-01-27 ENCOUNTER — OFFICE VISIT (OUTPATIENT)
Dept: URGENT CARE | Facility: CLINIC | Age: 25
End: 2025-01-27
Payer: COMMERCIAL

## 2025-01-27 VITALS
OXYGEN SATURATION: 98 % | TEMPERATURE: 98.3 F | WEIGHT: 230 LBS | RESPIRATION RATE: 16 BRPM | BODY MASS INDEX: 38.32 KG/M2 | SYSTOLIC BLOOD PRESSURE: 128 MMHG | DIASTOLIC BLOOD PRESSURE: 80 MMHG | HEART RATE: 114 BPM | HEIGHT: 65 IN

## 2025-01-27 DIAGNOSIS — R68.89 FLU-LIKE SYMPTOMS: ICD-10-CM

## 2025-01-27 DIAGNOSIS — J02.9 SORE THROAT: ICD-10-CM

## 2025-01-27 PROCEDURE — 3079F DIAST BP 80-89 MM HG: CPT | Performed by: PHYSICIAN ASSISTANT

## 2025-01-27 PROCEDURE — 87651 STREP A DNA AMP PROBE: CPT | Performed by: PHYSICIAN ASSISTANT

## 2025-01-27 PROCEDURE — 3074F SYST BP LT 130 MM HG: CPT | Performed by: PHYSICIAN ASSISTANT

## 2025-01-27 PROCEDURE — 0241U POCT CEPHEID COV-2, FLU A/B, RSV - PCR: CPT | Performed by: PHYSICIAN ASSISTANT

## 2025-01-27 PROCEDURE — 99214 OFFICE O/P EST MOD 30 MIN: CPT | Performed by: PHYSICIAN ASSISTANT

## 2025-01-27 ASSESSMENT — ENCOUNTER SYMPTOMS
HEADACHES: 1
SORE THROAT: 1
COUGH: 1
FEVER: 0
CHILLS: 0

## 2025-01-27 ASSESSMENT — FIBROSIS 4 INDEX: FIB4 SCORE: 0.6

## 2025-01-27 NOTE — PROGRESS NOTES
Subjective:   Amarilis Gillespie is a 24 y.o. female who presents today with   Chief Complaint   Patient presents with    Headache    Pharyngitis    Nasal Congestion       Pharyngitis   This is a new problem. The current episode started yesterday. The problem has been unchanged. There has been no fever. The pain is mild. Associated symptoms include congestion, coughing and headaches. Associated symptoms comments: Body aches. She has tried gargles and NSAIDs (netti pot) for the symptoms. The treatment provided mild relief.       PMH:  has a past medical history of Pregnant.  MEDS:   Current Outpatient Medications:     lidocaine (XYLOCAINE) 2 % Solution, Take 15 mL by mouth as needed for Throat/Mouth Pain. Gargle and spit every 4 hours as needed for pain. (Patient not taking: Reported on 1/27/2025), Disp: 600 mL, Rfl: 0    ondansetron (ZOFRAN ODT) 4 MG TABLET DISPERSIBLE, Take 1 Tablet by mouth every 6 hours as needed for Nausea/Vomiting. (Patient not taking: Reported on 1/27/2025), Disp: 20 Tablet, Rfl: 0    drospirenone-ethinyl estradiol (JERAMY) 3-0.02 MG per tablet, , Disp: , Rfl:     metformin (GLUCOPHAGE) 1000 MG tablet, Take 1 Tablet by mouth 2 times a day with meals. (Patient not taking: Reported on 1/27/2025), Disp: 180 Tablet, Rfl: 1    valacyclovir (VALTREX) 1 GM Tab, Take 1 Tablet by mouth 2 times a day. (Patient not taking: Reported on 1/27/2025), Disp: 20 Tablet, Rfl: 0  ALLERGIES: No Known Allergies  SURGHX:   Past Surgical History:   Procedure Laterality Date    DILATION AND EVACUATION N/A 9/19/2022    Procedure: SUCTION DILATION AND EVACUATION;  Surgeon: Oz Encarnacion M.D.;  Location: SURGERY SAME DAY [facility];  Service: Gynecology    APPENDECTOMY  2007     SOCHX:  reports that she has never smoked. She has never used smokeless tobacco. She reports that she does not drink alcohol and does not use drugs.  FH: Reviewed with patient, not pertinent to this visit.       Review of Systems  "  Constitutional:  Negative for chills and fever.   HENT:  Positive for congestion and sore throat.    Respiratory:  Positive for cough.    Neurological:  Positive for headaches.        Objective:   /80 (BP Location: Left arm, Patient Position: Sitting, BP Cuff Size: Large adult)   Pulse (!) 114   Temp 36.8 °C (98.3 °F) (Temporal)   Resp 16   Ht 1.651 m (5' 5\")   Wt 104 kg (230 lb)   SpO2 98%   BMI 38.27 kg/m²   Physical Exam  Vitals and nursing note reviewed.   Constitutional:       General: She is not in acute distress.     Appearance: Normal appearance. She is well-developed. She is not ill-appearing or toxic-appearing.   HENT:      Head: Normocephalic and atraumatic.      Right Ear: Hearing normal.      Left Ear: Hearing normal.      Nose: Congestion present.      Mouth/Throat:      Mouth: Mucous membranes are moist.      Pharynx: Uvula midline. Posterior oropharyngeal erythema present. No oropharyngeal exudate or uvula swelling.      Tonsils: No tonsillar exudate or tonsillar abscesses.   Cardiovascular:      Rate and Rhythm: Normal rate and regular rhythm.      Heart sounds: Normal heart sounds.   Pulmonary:      Effort: Pulmonary effort is normal. No respiratory distress.      Breath sounds: Normal breath sounds. No stridor. No wheezing, rhonchi or rales.   Musculoskeletal:      Comments: Normal movement in all 4 extremities   Skin:     General: Skin is warm and dry.   Neurological:      Mental Status: She is alert.      Coordination: Coordination normal.   Psychiatric:         Mood and Affect: Mood normal.       STREP A -  COVID -  FLU -  RSV -    Assessment/Plan:   Assessment    1. Sore throat  - POCT GROUP A STREP, PCR    2. Flu-like symptoms  - POCT CoV-2, Flu A/B, RSV by PCR    Symptoms and presentation consistent with viral illness at this time.  Vital signs are stable on exam today.     Patient encouraged to get plenty of rest, use OTC tylenol for pain/fever, and drink plenty of " fluids.  No indication for antibiotics at this time.  Continue with over-the-counter symptomatic relief per 's instructions.    Differential diagnosis, natural history, supportive care, and indications for immediate follow-up discussed.   Patient given instructions and understanding of medications and treatment.    If not improving in 3-5 days, F/U with PCP or return to UC if symptoms worsen.    Patient agreeable to plan.      Please note that this dictation was created using voice recognition software. I have made every reasonable attempt to correct obvious errors, but I expect that there are errors of grammar and possibly content that I did not discover before finalizing the note.    Jamie Fernandez PA-C

## 2025-01-27 NOTE — LETTER
January 27, 2025         Patient: Amarilis Gillespie   YOB: 2000   Date of Visit: 1/27/2025           To Whom it May Concern:    Amarilis Gillespie was seen in my clinic on 1/27/2025.  Please excuse her absence today and tomorrow.    If you have any questions or concerns, please don't hesitate to call.        Sincerely,           Jamie Fernandez P.A.-C.  Electronically Signed

## 2025-03-04 ENCOUNTER — HOSPITAL ENCOUNTER (EMERGENCY)
Facility: MEDICAL CENTER | Age: 25
End: 2025-03-04
Attending: EMERGENCY MEDICINE
Payer: COMMERCIAL

## 2025-03-04 VITALS
SYSTOLIC BLOOD PRESSURE: 125 MMHG | WEIGHT: 242.95 LBS | BODY MASS INDEX: 41.48 KG/M2 | RESPIRATION RATE: 18 BRPM | HEART RATE: 101 BPM | OXYGEN SATURATION: 95 % | DIASTOLIC BLOOD PRESSURE: 86 MMHG | HEIGHT: 64 IN | TEMPERATURE: 98.6 F

## 2025-03-04 DIAGNOSIS — J06.9 UPPER RESPIRATORY TRACT INFECTION, UNSPECIFIED TYPE: ICD-10-CM

## 2025-03-04 DIAGNOSIS — J10.1 INFLUENZA B: ICD-10-CM

## 2025-03-04 LAB
FLUAV RNA SPEC QL NAA+PROBE: NEGATIVE
FLUBV RNA SPEC QL NAA+PROBE: POSITIVE
RSV RNA SPEC QL NAA+PROBE: NEGATIVE
SARS-COV-2 RNA RESP QL NAA+PROBE: NOTDETECTED
SPECIMEN SOURCE: ABNORMAL

## 2025-03-04 PROCEDURE — A9270 NON-COVERED ITEM OR SERVICE: HCPCS | Performed by: EMERGENCY MEDICINE

## 2025-03-04 PROCEDURE — 0241U HCHG SARS-COV-2 COVID-19 NFCT DS RESP RNA 4 TRGT MIC: CPT

## 2025-03-04 PROCEDURE — 700102 HCHG RX REV CODE 250 W/ 637 OVERRIDE(OP): Performed by: EMERGENCY MEDICINE

## 2025-03-04 PROCEDURE — 99283 EMERGENCY DEPT VISIT LOW MDM: CPT

## 2025-03-04 RX ORDER — OSELTAMIVIR PHOSPHATE 75 MG/1
75 CAPSULE ORAL ONCE
Status: COMPLETED | OUTPATIENT
Start: 2025-03-04 | End: 2025-03-04

## 2025-03-04 RX ORDER — OSELTAMIVIR PHOSPHATE 75 MG/1
75 CAPSULE ORAL 2 TIMES DAILY
Qty: 10 CAPSULE | Refills: 0 | Status: ACTIVE | OUTPATIENT
Start: 2025-03-04 | End: 2025-03-09

## 2025-03-04 RX ADMIN — OSELTAMIVIR PHOSPHATE 75 MG: 75 CAPSULE ORAL at 21:18

## 2025-03-04 ASSESSMENT — FIBROSIS 4 INDEX: FIB4 SCORE: 0.6

## 2025-03-05 NOTE — ED NOTES
PO med given to pt  taken well  cleared for d/c  dischg instructions to pt  verbally understands that Rx Tamiflu was sent to listed pharmacy  she is to pick it up tomorrow and take as directed   d/c'ed to home in NAD

## 2025-03-05 NOTE — ED PROVIDER NOTES
"  ER Provider Note    Scribed for Scott Estrada M.D. by Junior Be. 3/4/2025   8:56 PM    Primary Care Provider: Tg Ragland D.N.P.    CHIEF COMPLAINT  Chief Complaint   Patient presents with    Flu Like Symptoms     PT presents d/t fevers, body aches, cough, and headache x 2 days      EXTERNAL RECORDS REVIEWED  Outpatient Notes Reviewed Urgent Care visit from 1/27/25 which reports that the patient presented with complaints of headache, pharyngitis, and nasal congestion. She was informed to continue with OTC symptomatic relief.    HPI/ROS  LIMITATION TO HISTORY   Select: : None  OUTSIDE HISTORIAN(S):  None    Amarilis Gillespie is a 24 y.o. female who presents to the ED for evaluation of flu like symptoms onset two days ago. The patient reports symptoms of fevers, body aches, cough, headache, and a sore throat which she has somewhat alleviated with teas and \"saline gargles\". She denies a history of asthma.     PAST MEDICAL HISTORY  Past Medical History:   Diagnosis Date    Pregnant      SURGICAL HISTORY  Past Surgical History:   Procedure Laterality Date    DILATION AND EVACUATION N/A 9/19/2022    Procedure: SUCTION DILATION AND EVACUATION;  Surgeon: Oz Encarnacion M.D.;  Location: SURGERY SAME DAY [facility];  Service: Gynecology    APPENDECTOMY  2007     FAMILY HISTORY  Family History   Problem Relation Age of Onset    No Known Problems Mother     No Known Problems Father     Hypertension Maternal Grandmother     Cancer Maternal Grandfather         thyroid    Hypertension Maternal Grandfather      SOCIAL HISTORY   reports that she has never smoked. She has never used smokeless tobacco. She reports that she does not drink alcohol and does not use drugs.    CURRENT MEDICATIONS  Discharge Medication List as of 3/4/2025  9:20 PM        CONTINUE these medications which have NOT CHANGED    Details   lidocaine (XYLOCAINE) 2 % Solution Take 15 mL by mouth as needed for Throat/Mouth Pain. Gargle and spit " "every 4 hours as needed for pain., Disp-600 mL, R-0, Normal      ondansetron (ZOFRAN ODT) 4 MG TABLET DISPERSIBLE Take 1 Tablet by mouth every 6 hours as needed for Nausea/Vomiting., Disp-20 Tablet, R-0, Normal      drospirenone-ethinyl estradiol (JERAMY) 3-0.02 MG per tablet Historical Med      metformin (GLUCOPHAGE) 1000 MG tablet Take 1 Tablet by mouth 2 times a day with meals.PCOS- E28.2; a1c at 5.0; increasing doseDisp-180 Tablet, R-1, Normal      valacyclovir (VALTREX) 1 GM Tab Take 1 Tablet by mouth 2 times a day., Disp-20 Tablet, R-0, Normal           ALLERGIES  No Known Allergies     PHYSICAL EXAM  BP (!) 145/77   Pulse (!) 105   Temp 37.9 °C (100.3 °F) (Temporal)   Resp 18   Ht 1.626 m (5' 4\")   Wt 110 kg (242 lb 15.2 oz)   LMP 01/15/2025 (Approximate)   SpO2 95%   BMI 41.70 kg/m²      Nursing note and vitals reviewed.  Constitutional: Well-developed and well-nourished. No distress.   HENT: Head is normocephalic and atraumatic. Oropharynx is clear and moist without exudate or erythema. Clear rhinorrhea.  Eyes: Pupils are equal, round, and reactive to light. Conjunctiva are normal.   Cardiovascular: Normal rate and regular rhythm. No murmur heard. Normal radial pulses.  Pulmonary/Chest: Breath sounds normal. No wheezes or rales.   Abdominal: Soft and non-tender. No distention    Musculoskeletal: Extremities exhibit normal range of motion without edema or tenderness.   Neurological: Awake, alert and oriented to person, place, and time. No focal deficits noted.  Skin: Skin is warm and dry. No rash.   Psychiatric: Normal mood and affect. Appropriate for clinical situation    DIAGNOSTIC STUDIES    Labs:   Results for orders placed or performed during the hospital encounter of 03/04/25   CoV-2, Flu A/B, And RSV by PCR ([place])    Collection Time: 03/04/25  7:23 PM    Specimen: Nasopharyngeal; Respirate   Result Value Ref Range    Influenza virus A RNA Negative Negative    Influenza virus B, PCR POSITIVE " (A) Negative    RSV, PCR Negative Negative    SARS-CoV-2 by PCR NotDetected     SARS-CoV-2 Source NP Swab      INITIAL ASSESSMENT AND PLAN    8:56 PM - Patient was evaluated at bedside. She presents for evaluation of flu like symptoms onset two days ago with a main complaint of a sore throat which she has been treating with at home remedies for minor alleviation. Ordered for CoV-2 Flu A/B and RSV PCR to evaluate. I discussed the patient's diagnostic study results which show that she tested positive for Influenza B. I discussed plan for discharge and follow up as outlined below. The patient is stable for discharge at this time and will return for any new or worsening symptoms. Patient verbalizes understanding and support with my plan for discharge.       ED Observation Status? No; Patient does not meet criteria for ED Observation.      COURSE AND MEDICAL DECISION MAKING    The patient presents today with signs and symptoms consistent with a viral upper respiratory infection. They have a normal pulse oximetry on room air and a normal pulmonary exam. Therefore, I feel that the likelihood of pneumonia is low. This patient does not demonstrate any clinical evidence of pneumonia, meningitis, appendicitis, or other acute medical emergency. Overall, the patient is very well appearing. I do not feel that this patient would benefit from antibiotics at this time. I have recommended Tylenol and/or ibuprofen for fever. The risks and benefits of Tamiflu were discussed with the patient and through joint decision making, it was decided to go forward with the treatment.    DISPOSITION AND DISCUSSIONS    I have discussed management of the patient with the following physicians and DAVID's:  None    Discussion of management with other QHP or appropriate source(s): None     Barriers to care at this time, including but not limited to:  No known barriers to care .     The patient will return for new or worsening symptoms and is stable at  the time of discharge.    The patient is referred to a primary physician for blood pressure management, diabetic screening, and for all other preventative health concerns.    DISPOSITION:  Patient will be discharged home in stable condition.    FOLLOW UP:  CECELIA SpringerP.  [address] [facility]  [place] [address] [phone]    Schedule an appointment as soon as possible for a visit       [facility], Emergency Dept  [address] [facility]  [place] [id number] [phone]    If symptoms worsen    OUTPATIENT MEDICATIONS:  Discharge Medication List as of 3/4/2025  9:20 PM        START taking these medications    Details   oseltamivir (TAMIFLU) 75 MG Cap Take 1 Capsule by mouth 2 times a day for 5 days., Disp-10 Capsule, R-0, Normal           FINAL DIAGNOSIS  1. Upper respiratory tract infection, unspecified type    2. Influenza B       IJunior (Scribe), am scribing for, and in the presence of, Scott Estrada M.D..    Electronically signed by: Junior Be (Alicia), 3/4/2025    Scott PRUETT M.D. personally performed the services described in this documentation, as scribed by Junior Be in my presence, and it is both accurate and complete.      The note accurately reflects work and decisions made by me.  Scott Estrada M.D.  3/4/2025  10:12 PM

## 2025-03-05 NOTE — ED TRIAGE NOTES
"Chief Complaint   Patient presents with    Flu Like Symptoms     PT presents d/t fevers, body aches, cough, and headache x 2 days      BP (!) 145/77   Pulse (!) 105   Temp 37.9 °C (100.3 °F) (Temporal)   Resp 18   Ht 1.626 m (5' 4\")   Wt 110 kg (242 lb 15.2 oz)   LMP 01/15/2025 (Approximate)   SpO2 95%   BMI 41.70 kg/m²     "

## 2025-05-30 NOTE — DISCHARGE INSTRUCTIONS
Dr. Encarnacion, OB/GYN, who saw you in the emergency department today, will be calling you this afternoon to schedule an appointment for outpatient procedure.   No assistance needed

## (undated) DEVICE — VACURETTE 7MM CURVED 10/PKG

## (undated) DEVICE — GLOVE SZ 8 BIOGEL PI MICRO - PF LF (50PR/BX)

## (undated) DEVICE — WATER IRRIGATION STERILE 1000ML (12EA/CA)

## (undated) DEVICE — TUBING D & E COLLECTION SET (50EA/PK)

## (undated) DEVICE — MASK OXYGEN VNYL ADLT MED CONC WITH 7 FOOT TUBING  - (50EA/CA)

## (undated) DEVICE — KIT  I.V. START (100EA/CA)

## (undated) DEVICE — TUBE CONNECTING SUCTION - CLEAR PLASTIC STERILE 72 IN (50EA/CA)

## (undated) DEVICE — MASK AIRWAY SIZE 3 UNIQUE SILICON (10/BX)

## (undated) DEVICE — SET LEADWIRE 5 LEAD BEDSIDE DISPOSABLE ECG (1SET OF 5/EA)

## (undated) DEVICE — KIT D & C COLLECTION (10EA/PK)

## (undated) DEVICE — CANISTER SUCTION RIGID RED 1500CC (40EA/CA)

## (undated) DEVICE — SENSOR OXIMETER ADULT SPO2 RD SET (20EA/BX)

## (undated) DEVICE — LACTATED RINGERS INJ 1000 ML - (14EA/CA 60CA/PF)

## (undated) DEVICE — PAD SANITARY 11IN MAXI IND WRAPPED  (12EA/PK 24PK/CA)

## (undated) DEVICE — Device

## (undated) DEVICE — CANNULA W/ SUPPLY TUBING O2 - (50/CA)

## (undated) DEVICE — GOWN WARMING STANDARD FLEX - (30/CA)

## (undated) DEVICE — CANISTER SUCTION 3000ML MECHANICAL FILTER AUTO SHUTOFF MEDI-VAC NONSTERILE LF DISP  (40EA/CA)

## (undated) DEVICE — SODIUM CHL IRRIGATION 0.9% 1000ML (12EA/CA)

## (undated) DEVICE — TUBING CLEARLINK DUO-VENT - C-FLO (48EA/CA)

## (undated) DEVICE — GLOVE BIOGEL PI INDICATOR SZ 7.5 SURGICAL PF LF -(50/BX 4BX/CA)

## (undated) DEVICE — SUCTION INSTRUMENT YANKAUER BULBOUS TIP W/O VENT (50EA/CA)

## (undated) DEVICE — SLEEVE VASO CALF MED - (10PR/CA)

## (undated) DEVICE — TOWEL STOP TIMEOUT SAFETY FLAG (40EA/CA)

## (undated) DEVICE — VACURETTE 8MM CURVED 10/PKG